# Patient Record
Sex: FEMALE | Race: WHITE | HISPANIC OR LATINO | Employment: OTHER | URBAN - METROPOLITAN AREA
[De-identification: names, ages, dates, MRNs, and addresses within clinical notes are randomized per-mention and may not be internally consistent; named-entity substitution may affect disease eponyms.]

---

## 2023-02-24 ENCOUNTER — OFFICE VISIT (OUTPATIENT)
Dept: PEDIATRICS | Facility: CLINIC | Age: 78
End: 2023-02-24

## 2023-02-24 VITALS
WEIGHT: 184.2 LBS | OXYGEN SATURATION: 97 % | TEMPERATURE: 98.9 F | SYSTOLIC BLOOD PRESSURE: 137 MMHG | HEART RATE: 71 BPM | DIASTOLIC BLOOD PRESSURE: 84 MMHG

## 2023-02-24 DIAGNOSIS — Z13.0 SCREENING, ANEMIA, DEFICIENCY, IRON: ICD-10-CM

## 2023-02-24 DIAGNOSIS — Z13.1 SCREENING FOR DIABETES MELLITUS: ICD-10-CM

## 2023-02-24 DIAGNOSIS — H57.9 EYE PROBLEM: Primary | ICD-10-CM

## 2023-02-24 DIAGNOSIS — Z13.220 SCREENING FOR HYPERLIPIDEMIA: ICD-10-CM

## 2023-02-24 DIAGNOSIS — Z11.59 NEED FOR HEPATITIS C SCREENING TEST: ICD-10-CM

## 2023-02-24 LAB
ANION GAP SERPL CALCULATED.3IONS-SCNC: 13 MMOL/L (ref 7–15)
BASOPHILS # BLD AUTO: 0 10E3/UL (ref 0–0.2)
BASOPHILS NFR BLD AUTO: 0 %
BUN SERPL-MCNC: 13.1 MG/DL (ref 8–23)
CALCIUM SERPL-MCNC: 9.2 MG/DL (ref 8.8–10.2)
CHLORIDE SERPL-SCNC: 106 MMOL/L (ref 98–107)
CHOLEST SERPL-MCNC: 235 MG/DL
CREAT SERPL-MCNC: 0.73 MG/DL (ref 0.51–0.95)
DEPRECATED HCO3 PLAS-SCNC: 23 MMOL/L (ref 22–29)
EOSINOPHIL # BLD AUTO: 0.2 10E3/UL (ref 0–0.7)
EOSINOPHIL NFR BLD AUTO: 4 %
ERYTHROCYTE [DISTWIDTH] IN BLOOD BY AUTOMATED COUNT: 13.8 % (ref 10–15)
GFR SERPL CREATININE-BSD FRML MDRD: 84 ML/MIN/1.73M2
GLUCOSE SERPL-MCNC: 91 MG/DL (ref 70–99)
HCT VFR BLD AUTO: 41.1 % (ref 35–47)
HDLC SERPL-MCNC: 45 MG/DL
HGB BLD-MCNC: 13.6 G/DL (ref 11.7–15.7)
LDLC SERPL CALC-MCNC: 159 MG/DL
LYMPHOCYTES # BLD AUTO: 1.5 10E3/UL (ref 0.8–5.3)
LYMPHOCYTES NFR BLD AUTO: 27 %
MCH RBC QN AUTO: 29.3 PG (ref 26.5–33)
MCHC RBC AUTO-ENTMCNC: 33.1 G/DL (ref 31.5–36.5)
MCV RBC AUTO: 89 FL (ref 78–100)
MONOCYTES # BLD AUTO: 0.4 10E3/UL (ref 0–1.3)
MONOCYTES NFR BLD AUTO: 7 %
NEUTROPHILS # BLD AUTO: 3.7 10E3/UL (ref 1.6–8.3)
NEUTROPHILS NFR BLD AUTO: 63 %
NONHDLC SERPL-MCNC: 190 MG/DL
PLATELET # BLD AUTO: 294 10E3/UL (ref 150–450)
POTASSIUM SERPL-SCNC: 4 MMOL/L (ref 3.4–5.3)
RBC # BLD AUTO: 4.64 10E6/UL (ref 3.8–5.2)
SODIUM SERPL-SCNC: 142 MMOL/L (ref 136–145)
TRIGL SERPL-MCNC: 155 MG/DL
WBC # BLD AUTO: 5.8 10E3/UL (ref 4–11)

## 2023-02-24 PROCEDURE — 99203 OFFICE O/P NEW LOW 30 MIN: CPT | Performed by: PHYSICIAN ASSISTANT

## 2023-02-24 PROCEDURE — 80061 LIPID PANEL: CPT | Performed by: PHYSICIAN ASSISTANT

## 2023-02-24 PROCEDURE — 80048 BASIC METABOLIC PNL TOTAL CA: CPT | Performed by: PHYSICIAN ASSISTANT

## 2023-02-24 PROCEDURE — 85025 COMPLETE CBC W/AUTO DIFF WBC: CPT | Performed by: PHYSICIAN ASSISTANT

## 2023-02-24 PROCEDURE — 86803 HEPATITIS C AB TEST: CPT | Performed by: PHYSICIAN ASSISTANT

## 2023-02-24 PROCEDURE — 36415 COLL VENOUS BLD VENIPUNCTURE: CPT | Performed by: PHYSICIAN ASSISTANT

## 2023-02-24 ASSESSMENT — ENCOUNTER SYMPTOMS: EYE PAIN: 1

## 2023-02-24 ASSESSMENT — PAIN SCALES - GENERAL: PAINLEVEL: NO PAIN (0)

## 2023-02-24 NOTE — PROGRESS NOTES
Assessment & Plan     Eye problem    - Adult Eye  Referral; Future    - Also, informed patient of the current referral placed by [facility].  They can call this number and setup appointment as well.      Need for hepatitis C screening test    - Hepatitis C Screen Reflex to HCV RNA Quant and Genotype; Future  - Hepatitis C Screen Reflex to HCV RNA Quant and Genotype    Screening for hyperlipidemia    - Lipid panel reflex to direct LDL Non-fasting; Future  - Lipid panel reflex to direct LDL Non-fasting    Screening for diabetes mellitus    - Basic metabolic panel  (Ca, Cl, CO2, Creat, Gluc, K, Na, BUN); Future  - Basic metabolic panel  (Ca, Cl, CO2, Creat, Gluc, K, Na, BUN)    Screening, anemia, deficiency, iron    - CBC with platelets and differential; Future  - CBC with platelets and differential         Appointment made with Dr. Cantor for followup, patient not able to establish with me, as I am a PCP partner.  This was explained to patient.  Patient will get physical and review labs with primary as scheduled.  Patient to followup with eye referral ASAP as well.      Return in about 1 week (around 3/3/2023), or Eye referral and then see primary care as scheduled.  Please be seen sooner if needed..    Patient plan:  Labs ordered today and will be drawn today as well.      You have a referral from the eye doctor for Vitreo Retinal Surgery.  You need to call them for an appointment and bring your referral.  Here is their number: [phone].     Please followup with a primary doctor as scheduled today to go over the lab work and complete a physical for you.  Donna will help you get this scheduled.      Jyoti Loera PA-C  [facility] CYNDI Lester is a 78 year old Tayler Friend, presenting for the following health issues:  Eye Problem (Bleeding in her back of the eye. Needs referral to specialist.)      Eye Problem          Concern - Need to establish care, get labs and  "referral for recent eye issue  Onset: New - Unsure when started  Description: She only sees \"dark\" out of half of her left eye.  Has been seen by eye doctor for this.    Intensity: moderate  Progression of Symptoms:  constant  Accompanying Signs & Symptoms: No other concerns by patient today  Previous history of similar problem: No      Was seen at [facility] and given referral for Vitreo Retinal Surgery.  Note from eye doctor reads: Macula Edema, Calero Spots, dots and blot, Microaneurysm of unknown etiology.   They were given a referral for surgery, but have not scheduled appointment yet.  Patient is brand new to [place] and does not have a primary doctor.  They were hoping to establish care today and get lab work done. Patient is otherwise healthy without other concerns today.  She does not have a history of chronic medical problems and denies any current medications or allergies.    Patient speaks Sinhala and is with a family member today who speaks some Cypriot and some English.        Review of Systems   Eyes: Positive for pain.      Constitutional, HEENT, cardiovascular, pulmonary, gi and gu systems are negative, except as otherwise noted.      Objective    /84 (BP Location: Right arm, Patient Position: Sitting, Cuff Size: Adult Large)   Pulse 71   Temp 98.9  F (37.2  C)   Wt 83.6 kg (184 lb 3.2 oz)   HC 16 cm (6.3\")   SpO2 97%   There is no height or weight on file to calculate BMI.  Physical Exam   GENERAL: healthy, alert and no distress  EYES: Eyes grossly normal to inspection, PERRL and conjunctivae and sclerae normal  RESP: lungs clear to auscultation - no rales, rhonchi or wheezes  CV: regular rate and rhythm, normal S1 S2, no S3 or S4, no murmur, click or rub, no peripheral edema and peripheral pulses strong  MS: no gross musculoskeletal defects noted, no edema  NEURO: Normal strength and tone, mentation intact and speech normal      Jyoti Loera PA-C            "

## 2023-02-24 NOTE — PATIENT INSTRUCTIONS
Labs ordered today and will be drawn today as well.      You have a referral from the eye doctor for Vitreo Retinal Surgery.  You need to call them for an appointment and bring your referral.  Here is their number: [phone].     Please followup with a primary doctor as scheduled today to go over the lab work and complete a physical for you.  Donna will help you get this scheduled.

## 2023-02-25 LAB — HCV AB SERPL QL IA: NONREACTIVE

## 2023-02-28 NOTE — RESULT ENCOUNTER NOTE
Note to staff: Please send a result letter    The results from your recent lab work are within normal limits.    -Normal red blood cell (hgb) levels, normal white blood cell count and normal platelet levels.  -LDL(bad) cholesterol level is elevated, HDL(good) cholesterol level is low and your triglycerides are elevated which can increase your heart disease risk.  A diet high in fat and simple carbohydrates, genetics and being overweight can contribute to this. ADVISE: exercising 150 minutes of aerobic exercise per week (30 minutes for 5 days per week or 50 minutes for 3 days per week are options), eating a low saturated fat/low carbohydrate diet, and omega-3 fatty acids (fish oil) 6095-8928 mg daily are helpful to improve this. In 3 months, you should recheck your fasting cholesterol panel by scheduling a lab-only appointment.  -Kidney function is normal (Cr, GFR), Sodium is normal, Potassium is normal, Calcium is normal, Glucose is normal.   -Hepatitis C antibody screen test shows no signs of a previous hepatitis C infection.    I still advised that you establish care with a primary doctor and they will recheck your cholesterol and even consider medication therapy for it as well.        Thank you for choosing [facility] for your health care needs,      Jyoti Loera PA-C

## 2023-06-28 ENCOUNTER — TRANSFERRED RECORDS (OUTPATIENT)
Dept: HEALTH INFORMATION MANAGEMENT | Facility: CLINIC | Age: 78
End: 2023-06-28
Payer: COMMERCIAL

## 2023-07-03 ENCOUNTER — TELEPHONE (OUTPATIENT)
Dept: URGENT CARE | Facility: URGENT CARE | Age: 78
End: 2023-07-03
Payer: COMMERCIAL

## 2023-07-03 ENCOUNTER — TELEPHONE (OUTPATIENT)
Dept: PEDIATRICS | Facility: CLINIC | Age: 78
End: 2023-07-03
Payer: COMMERCIAL

## 2023-07-03 DIAGNOSIS — H35.22: ICD-10-CM

## 2023-07-03 DIAGNOSIS — H34.232 BRANCH RETINAL ARTERY OCCLUSION OF LEFT EYE: Primary | ICD-10-CM

## 2023-07-03 NOTE — TELEPHONE ENCOUNTER
Naomi called on behalf of Tayler to interpret for her. Naomi inquires where she can get Tayler scheduled for imaging ordered by her specialist. Informed Naomi that no orders are in her chart, and she states that she has paper orders. Recommended Naomi and Tayler contact the [facility] at [phone] to get scheduled and to let them know they have outside orders.     Gonzalo Sigala RN on 7/3/2023 at 1:02 PM

## 2023-07-03 NOTE — TELEPHONE ENCOUNTER
Friend (Naomi) calling to interpret for patient. Pt on phone gave verbal consent.  Pt trying to schedule est care appt with a provider in [facility] and to get further imaging as advised by ophthemologist after seeing blood in part of left eye.    Patient has had blurry vision and seeing spots since February 2023  No active orders in chart review for imaging.  Pt was told she probably had a silent stroke at some point and possibly has small blood clots effecting vision.     Triaged symptoms; no new sx or stroke sx at this time. Routed to [place] [facility] to assist patient in scheduling appt. there.     Jimena FARMER RN  [facility]

## 2023-07-07 NOTE — TELEPHONE ENCOUNTER
The pt is aware and scheduled for her upcoming appointment.   Yulia Kenyon on 7/7/2023 at 7:42 AM

## 2023-07-09 ENCOUNTER — HOSPITAL ENCOUNTER (OUTPATIENT)
Dept: MRI IMAGING | Facility: CLINIC | Age: 78
Discharge: HOME | End: 2023-07-09
Attending: OPHTHALMOLOGY
Payer: COMMERCIAL

## 2023-07-09 DIAGNOSIS — H35.22: ICD-10-CM

## 2023-07-09 DIAGNOSIS — H34.232 BRANCH RETINAL ARTERY OCCLUSION OF LEFT EYE: ICD-10-CM

## 2023-07-09 PROCEDURE — 70544 MR ANGIOGRAPHY HEAD W/O DYE: CPT

## 2023-07-09 PROCEDURE — 70549 MR ANGIOGRAPH NECK W/O&W/DYE: CPT

## 2023-07-09 PROCEDURE — 70553 MRI BRAIN STEM W/O & W/DYE: CPT

## 2023-07-09 PROCEDURE — 255N000002 HC RX 255 OP 636: Performed by: OPHTHALMOLOGY

## 2023-07-09 PROCEDURE — A9585 GADOBUTROL INJECTION: HCPCS | Performed by: OPHTHALMOLOGY

## 2023-07-09 RX ORDER — GADOBUTROL 604.72 MG/ML
10 INJECTION INTRAVENOUS ONCE
Status: COMPLETED | OUTPATIENT
Start: 2023-07-09 | End: 2023-07-09

## 2023-07-09 RX ADMIN — GADOBUTROL 10 ML: 604.72 INJECTION INTRAVENOUS at 09:33

## 2023-08-09 DIAGNOSIS — H35.22: ICD-10-CM

## 2023-08-09 DIAGNOSIS — H34.232 BRANCH RETINAL ARTERY OCCLUSION OF LEFT EYE: Primary | ICD-10-CM

## 2023-10-10 ENCOUNTER — NURSE TRIAGE (OUTPATIENT)
Dept: PEDIATRICS | Facility: CLINIC | Age: 78
End: 2023-10-10
Payer: COMMERCIAL

## 2023-10-10 NOTE — TELEPHONE ENCOUNTER
S-(situation): Calling to get establish care appointment and care for acute bilateral leg swelling/redness/pain up to the knees.    B-(background): Bilateral leg swelling and redness started 1 month ago, pain started 2 weeks ago, pain is located posterior bilateral knees. Redness goes from toes to ankles and has started to spread up the legs. No streaking. Redness is not painful. No fever, no history of blood clots, cancer, heart failure, kidney disease or liver failure. Denies chest pain, difficulty breathing, no history of swelling in the past. Denies weeping, she is able to walk but after a while she complains of heel and anterior foot pain.       A-(assessment): Patient needs to be seen, not established with us.     R-(recommendations): Recommended urgent care for this concern and recommended she establish care with one of our providers going forward. Warm transferred call to  to schedule establish care appointment.     During call Patient's friend voiced frustration with our clinic stating that the patient was seen here twice and thought she was established here. Advised she was only seen here once according to her chart, and not sure how that appointment got scheduled as that provider is an extender only not a PCP option. (This was explained to patient according to the visit note.)    Recommended calling patient's insurance to get EOB for the second visit to verify where she was seen.     KIARA Melton on 10/10/2023 at 12:28 PM      Reason for Disposition   MODERATE swelling of both ankles (e.g., swelling extends up to the knees) AND new-onset or worsening    Additional Information   Negative: Swelling of face, arm or hands  (Exception: Slight puffiness of fingers during hot weather.)   Negative: Pregnant 20 or more weeks and sudden weight gain (i.e., > 2 lbs, 1 kg in one week)   Negative: Thigh or calf pain and only 1 side and present > 1 hour   Negative: Thigh, calf, or ankle swelling in only one leg    Negative: Thigh, calf, or ankle swelling in both legs, but one side is definitely more swollen (Exception: Longstanding difference between legs.)   Negative: Difficulty breathing at rest   Negative: Sounds like a life-threatening emergency to the triager   Negative: SEVERE swelling (e.g., swelling extends above knee, entire leg is swollen, weeping fluid)   Negative: Can't walk or can barely stand (new-onset)   Negative: Fever and red area (or area very tender to touch)    Protocols used: Leg Swelling and Edema-A-OH

## 2024-01-02 ENCOUNTER — OFFICE VISIT (OUTPATIENT)
Dept: PEDIATRICS | Facility: CLINIC | Age: 79
End: 2024-01-02
Payer: COMMERCIAL

## 2024-01-02 VITALS
BODY MASS INDEX: 34.83 KG/M2 | RESPIRATION RATE: 20 BRPM | HEIGHT: 60 IN | DIASTOLIC BLOOD PRESSURE: 82 MMHG | WEIGHT: 177.4 LBS | HEART RATE: 68 BPM | TEMPERATURE: 97.5 F | OXYGEN SATURATION: 98 % | SYSTOLIC BLOOD PRESSURE: 157 MMHG

## 2024-01-02 DIAGNOSIS — Z00.00 ROUTINE GENERAL MEDICAL EXAMINATION AT A HEALTH CARE FACILITY: Primary | ICD-10-CM

## 2024-01-02 DIAGNOSIS — I10 HYPERTENSION, UNSPECIFIED TYPE: ICD-10-CM

## 2024-01-02 DIAGNOSIS — R41.89 COGNITIVE IMPAIRMENT: ICD-10-CM

## 2024-01-02 DIAGNOSIS — H34.232 RETINAL ARTERY BRANCH OCCLUSION OF LEFT EYE: ICD-10-CM

## 2024-01-02 LAB
ALBUMIN SERPL BCG-MCNC: 4.1 G/DL (ref 3.5–5.2)
ALP SERPL-CCNC: 119 U/L (ref 40–150)
ALT SERPL W P-5'-P-CCNC: 11 U/L (ref 0–50)
ANION GAP SERPL CALCULATED.3IONS-SCNC: 10 MMOL/L (ref 7–15)
AST SERPL W P-5'-P-CCNC: 12 U/L (ref 0–45)
BILIRUB SERPL-MCNC: 0.3 MG/DL
BUN SERPL-MCNC: 16.8 MG/DL (ref 8–23)
CALCIUM SERPL-MCNC: 9.4 MG/DL (ref 8.8–10.2)
CHLORIDE SERPL-SCNC: 109 MMOL/L (ref 98–107)
CHOLEST SERPL-MCNC: 244 MG/DL
CREAT SERPL-MCNC: 0.72 MG/DL (ref 0.51–0.95)
DEPRECATED HCO3 PLAS-SCNC: 26 MMOL/L (ref 22–29)
EGFRCR SERPLBLD CKD-EPI 2021: 85 ML/MIN/1.73M2
FASTING STATUS PATIENT QL REPORTED: YES
GLUCOSE SERPL-MCNC: 107 MG/DL (ref 70–99)
HDLC SERPL-MCNC: 50 MG/DL
LDLC SERPL CALC-MCNC: 164 MG/DL
NONHDLC SERPL-MCNC: 194 MG/DL
POTASSIUM SERPL-SCNC: 3.8 MMOL/L (ref 3.4–5.3)
PROT SERPL-MCNC: 7.5 G/DL (ref 6.4–8.3)
SODIUM SERPL-SCNC: 145 MMOL/L (ref 135–145)
TRIGL SERPL-MCNC: 151 MG/DL
TSH SERPL DL<=0.005 MIU/L-ACNC: 1.99 UIU/ML (ref 0.3–4.2)

## 2024-01-02 PROCEDURE — 80061 LIPID PANEL: CPT | Performed by: INTERNAL MEDICINE

## 2024-01-02 PROCEDURE — 84443 ASSAY THYROID STIM HORMONE: CPT | Performed by: INTERNAL MEDICINE

## 2024-01-02 PROCEDURE — 36415 COLL VENOUS BLD VENIPUNCTURE: CPT | Performed by: INTERNAL MEDICINE

## 2024-01-02 PROCEDURE — 80053 COMPREHEN METABOLIC PANEL: CPT | Performed by: INTERNAL MEDICINE

## 2024-01-02 PROCEDURE — 99214 OFFICE O/P EST MOD 30 MIN: CPT | Mod: 25 | Performed by: INTERNAL MEDICINE

## 2024-01-02 PROCEDURE — 99397 PER PM REEVAL EST PAT 65+ YR: CPT | Performed by: INTERNAL MEDICINE

## 2024-01-02 ASSESSMENT — ENCOUNTER SYMPTOMS: BREAST MASS: 0

## 2024-01-02 ASSESSMENT — ACTIVITIES OF DAILY LIVING (ADL)
CURRENT_FUNCTION: LAUNDRY REQUIRES ASSISTANCE
CURRENT_FUNCTION: BATHING REQUIRES ASSISTANCE
CURRENT_FUNCTION: TRANSPORTATION REQUIRES ASSISTANCE

## 2024-01-02 ASSESSMENT — PAIN SCALES - GENERAL: PAINLEVEL: NO PAIN (0)

## 2024-01-02 NOTE — LETTER
January 3, 2024      Tayler Corcoran  [address] [place] [address]        Dear Tayler,     Here are your lab results.     1. Your cholesterol is elevated. Taken together with other risk factors, your estimated risk for cardiovascular events (heart attack or stroke) in the next 10 years is about 30%.  It is recommended to start cholesterol medication (statin) when the risk is greater than 7.5%.       I recommend you start a medication called atorvastatin. Can you please call clinic at [phone] to let us know what pharmacy you would like to use?       The 10-year ASCVD risk score (Manuel WALKER, et al., 2019) is: 30.2%    Values used to calculate the score:      Age: 78 years      Sex: Female      Is Non- : No      Diabetic: No      Tobacco smoker: No      Systolic Blood Pressure: 157 mmHg      Is BP treated: No      HDL Cholesterol: 50 mg/dL      Total Cholesterol: 244 mg/dL     2. Your fasting blood sugar and kidney, electrolyte, and liver tests were normal.     3. Your thyroid test was normal.     Please feel free to contact us with any questions or concerns!  Sincerely,  Hannah Yip MD  Internal Medicine - Pediatrics    Resulted Orders   Lipid panel reflex to direct LDL Fasting   Result Value Ref Range    Cholesterol 244 (H) <200 mg/dL    Triglycerides 151 (H) <150 mg/dL    Direct Measure HDL 50 >=50 mg/dL    LDL Cholesterol Calculated 164 (H) <=100 mg/dL    Non HDL Cholesterol 194 (H) <130 mg/dL    Patient Fasting > 8hrs? Yes     Narrative    Cholesterol  Desirable:  <200 mg/dL    Triglycerides  Normal:  Less than 150 mg/dL  Borderline High:  150-199 mg/dL  High:  200-499 mg/dL  Very High:  Greater than or equal to 500 mg/dL    Direct Measure HDL  Female:  Greater than or equal to 50 mg/dL   Male:  Greater than or equal to 40 mg/dL    LDL Cholesterol  Desirable:  <100mg/dL  Above Desirable:  100-129 mg/dL   Borderline High:  130-159 mg/dL   High:  160-189 mg/dL   Very High:  >=  190 mg/dL    Non HDL Cholesterol  Desirable:  130 mg/dL  Above Desirable:  130-159 mg/dL  Borderline High:  160-189 mg/dL  High:  190-219 mg/dL  Very High:  Greater than or equal to 220 mg/dL   Comprehensive metabolic panel (BMP + Alb, Alk Phos, ALT, AST, Total. Bili, TP)   Result Value Ref Range    Sodium 145 135 - 145 mmol/L      Comment:      Reference intervals for this test were updated on 09/26/2023 to more accurately reflect our healthy population. There may be differences in the flagging of prior results with similar values performed with this method. Interpretation of those prior results can be made in the context of the updated reference intervals.     Potassium 3.8 3.4 - 5.3 mmol/L    Carbon Dioxide (CO2) 26 22 - 29 mmol/L    Anion Gap 10 7 - 15 mmol/L    Urea Nitrogen 16.8 8.0 - 23.0 mg/dL    Creatinine 0.72 0.51 - 0.95 mg/dL    GFR Estimate 85 >60 mL/min/1.73m2    Calcium 9.4 8.8 - 10.2 mg/dL    Chloride 109 (H) 98 - 107 mmol/L    Glucose 107 (H) 70 - 99 mg/dL    Alkaline Phosphatase 119 40 - 150 U/L      Comment:      Reference intervals for this test were updated on 11/14/2023 to more accurately reflect our healthy population. There may be differences in the flagging of prior results with similar values performed with this method. Interpretation of those prior results can be made in the context of the updated reference intervals.    AST 12 0 - 45 U/L      Comment:      Reference intervals for this test were updated on 6/12/2023 to more accurately reflect our healthy population. There may be differences in the flagging of prior results with similar values performed with this method. Interpretation of those prior results can be made in the context of the updated reference intervals.    ALT 11 0 - 50 U/L      Comment:      Reference intervals for this test were updated on 6/12/2023 to more accurately reflect our healthy population. There may be differences in the flagging of prior results with similar  values performed with this method. Interpretation of those prior results can be made in the context of the updated reference intervals.      Protein Total 7.5 6.4 - 8.3 g/dL    Albumin 4.1 3.5 - 5.2 g/dL    Bilirubin Total 0.3 <=1.2 mg/dL   TSH with free T4 reflex   Result Value Ref Range    TSH 1.99 0.30 - 4.20 uIU/mL

## 2024-01-02 NOTE — PROGRESS NOTES
"SUBJECTIVE:   Tayler is a 78 year old, presenting for the following:  Wellness Visit and Establish Care        1/2/2024    10:32 AM   Additional Questions   Roomed by S   Accompanied by Barbara (friend)         1/2/2024    10:32 AM   Patient Reported Additional Medications   Patient reports taking the following new medications none       Are you in the first 12 months of your Medicare coverage?  No, not on Medicare, is currently on state insurance.     Healthy Habits:     In general, how would you rate your overall health?  Excellent    Frequency of exercise:  2-3 days/week    Duration of exercise:  Less than 15 minutes    Do you usually eat at least 4 servings of fruit and vegetables a day, include whole grains    & fiber and avoid regularly eating high fat or \"junk\" foods?  Yes    Taking medications regularly:  Not Applicable    Medication side effects:  Not applicable    Ability to successfully perform activities of daily living:  Transportation requires assistance, bathing requires assistance and laundry requires assistance    Home Safety:  No safety concerns identified    Hearing Impairment:  Feel that people are mumbling or not speaking clearly and need to ask people to speak up or repeat themselves    In the past 6 months, have you been bothered by leaking of urine?  No    In general, how would you rate your overall mental or emotional health?  Good    Additional concerns today:  Yes    Tayler is here for a preventive health visit.  Overall, she is doing well with the following concerns:      Here today with friend Tayler.   She has [facility] from [facility].   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 Sees only the top half of the visual field.   She is from [place], moved to [place] a couple of years ago and lives with some friends. They are looking to help her to get her citizenship (lives with friends) - she is a resident. They are asking for a letter from me for " immigration certifying that she his disability so that she does not have to take the test. Once she has citizenship, she could get medicare benefits and benefits.   She speaks very little.  Her friend talks for her during our visit, and it is apparent she understands the conversation. According to her friend, she had very little schooling, so she does not read or write much. She can read simple words in Burmese.  Her friend states that she can bathe independently,  but they always have someone there to help make sure she does not fall. She states that she can't be left alone. Neither the patient or her friend are aware of any trauma, accident, illness, or injury that has been associated with cognitive decline. She had a brain MRI/MRA this summer to assess her vision loss and she has mild diffuse atrophy likely due to small vessel disease and no evidence of large stroke.   Hypertension - hypertensive in clinic today. Her friend has taken her blood pressure occasionally at home and it has been good, somewhere near 120s/80s.   Lives in [place] for 2 years, before that about 5 years in [place].   Has 5 children.         Today's PHQ-2 Score:       1/2/2024    10:44 AM   PHQ-2 ( 1999 [employer])   Q1: Little interest or pleasure in doing things 0   Q2: Feeling down, depressed or hopeless 0   PHQ-2 Score 0   Q1: Little interest or pleasure in doing things Not at all   Q2: Feeling down, depressed or hopeless Not at all   PHQ-2 Score 0       Have you ever done Advance Care Planning? (For example, a Health Directive, POLST, or a discussion with a medical provider or your loved ones about your wishes): No, advance care planning information given to patient to review.  Advanced care planning was discussed at today's visit.       Fall risk  Fallen 2 or more times in the past year?: No  Any fall with injury in the past year?: No    Cognitive Screening   1) Repeat 3 items (Leader, Season, Table)    2) Clock draw: ABNORMAL unable to list all  numbers inside the clock   3) 3 item recall: Recalls 1 object   Results: ABNORMAL clock, 1-2 items recalled: PROBABLE COGNITIVE IMPAIRMENT, **INFORM PROVIDER**      Mini-CogTM Copyright MILIND Dewitt. Licensed by the author for use in [facility]; reprinted with permission ([email or web address]). All rights reserved.      Do you have sleep apnea, excessive snoring or daytime drowsiness? : no    Reviewed and updated as needed this visit by clinical staff   Tobacco  Allergies  Meds  Problems  Med Hx  Surg Hx  Fam Hx          Reviewed and updated as needed this visit by Provider   Tobacco  Allergies  Meds  Problems  Med Hx  Surg Hx  Fam Hx         Social History     Tobacco Use    Smoking status: Never     Passive exposure: Never    Smokeless tobacco: Never   Substance Use Topics    Alcohol use: Not on file             1/2/2024    10:44 AM   Alcohol Use   Prescreen: >3 drinks/day or >7 drinks/week? No     Do you have a current opioid prescription? No  Do you use any other controlled substances or medications that are not prescribed by a provider? None              Current providers sharing in care for this patient include:   Patient Care Team:  Hannah Yip MD as PCP - General (Internal Medicine)  Henny Billings CHW as Community Health Worker (Primary Care - CC)    The following health maintenance items are reviewed in Epic and correct as of today:  Health Maintenance   Topic Date Due    DEXA  Never done    ANNUAL REVIEW OF HM ORDERS  Never done    COVID-19 Vaccine (1) Never done    DTAP/TDAP/TD IMMUNIZATION (1 - Tdap) Never done    ZOSTER IMMUNIZATION (1 of 2) Never done    RSV VACCINE (Pregnancy & 60+) (1 - 1-dose 60+ series) Never done    Pneumococcal Vaccine: 65+ Years (1 of 1 - PCV) Never done    INFLUENZA VACCINE (1) Never done    MEDICARE ANNUAL WELLNESS VISIT  01/02/2025    FALL RISK ASSESSMENT  01/02/2025    LIPID  02/24/2028    ADVANCE CARE PLANNING  01/02/2029    HEPATITIS C SCREENING   "Completed    PHQ-2 (once per calendar year)  Completed    IPV IMMUNIZATION  Aged Out    HPV IMMUNIZATION  Aged Out    MENINGITIS IMMUNIZATION  Aged Out    RSV MONOCLONAL ANTIBODY  Aged Out             Pertinent mammograms are reviewed under the imaging tab.    Review of Systems   Breasts:  Negative for tenderness, breast mass and discharge.   Genitourinary:  Negative for pelvic pain, vaginal bleeding and vaginal discharge.         OBJECTIVE:   BP (!) 157/82 (BP Location: Right arm, Patient Position: Sitting, Cuff Size: Adult Large)   Pulse 68   Temp 97.5  F (36.4  C) (Tympanic)   Resp 20   Ht 1.531 m (5' 0.28\")   Wt 80.5 kg (177 lb 6.4 oz)   SpO2 98%   BMI 34.33 kg/m   Estimated body mass index is 34.33 kg/m  as calculated from the following:    Height as of this encounter: 1.531 m (5' 0.28\").    Weight as of this encounter: 80.5 kg (177 lb 6.4 oz).  Physical Exam  GENERAL: healthy, alert and no distress  EYES: Eyes grossly normal to inspection, PERRL and conjunctivae and sclerae normal  HENT: ear canals and TM's normal, nose and mouth without ulcers or lesions  RESP: lungs clear to auscultation - no rales, rhonchi or wheezes  CV: regular rate and rhythm, normal S1 S2, no S3 or S4, no murmur, click or rub, no peripheral edema and peripheral pulses strong  NEURO: Normal strength and tone and sensory exam grossly normal    Diagnostic Test Results:  Labs reviewed in Epic    ASSESSMENT / PLAN:   (Z00.00) Routine general medical examination at a health care facility  (primary encounter diagnosis)  Comment: Initial visit.  Routine labs ordered.  She has no records or history of previous health care. At follow up visit, will address vaccines.   Plan: DEXA HIP/PELVIS/SPINE - Future, Occupational         Therapy Referral, Adult Mental Health         Referral, Lipid panel reflex to direct LDL         Fasting, Comprehensive metabolic panel (BMP +         Alb, Alk Phos, ALT, AST, Total. Bili, TP), TSH         " with free T4 reflex, Primary Care - Care         Coordination Referral            (I10) Hypertension, unspecified type  Comment: Elevated in clinic today, but per report has been normal at home. Request readings in the next week and she will call clinic with those numbers.       (R41.89) Cognitive impairment  Comment: Unclear to what degree, and whether there has been a decline. The patient appears to understand spoken language, and she manages her pocket money per report. The family of friends whom she lives with may try to designate one daughter as a medical power of  to help her out. I have sent a care coordination referral to help with this and with the medical paperwork that has been requested for her citizenship application.   Plan: For evaluation of her cognition, I have sent an OT cognitive assessment referral as well as requested neurocognitive testing.     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 She has never been exposed to tobacco smoke. She has never used smokeless tobacco.      Appropriate preventive services were discussed with this patient, including applicable screening as appropriate for fall prevention, nutrition, physical activity, Tobacco-use cessation, weight loss and cognition.  Checklist reviewing preventive services available has been given to the patient.    Reviewed patients plan of care and provided an AVS. The Basic Care Plan (routine screening as documented in Health Maintenance) for Tayler meets the Care Plan requirement. This Care Plan has been established and reviewed with the Patient and caregiver.          Hannah Yip MD  [facility]    Identified Health Risks:  I have reviewed Opioid Use Disorder and Substance Use Disorder risk factors and made any needed referrals. The  patient reports that she has difficulty with activities of daily living. I have asked that the patient make a follow up appointment in 4 weeks where this issue will be further evaluated and addressed.  The patient was provided with written information regarding signs of hearing loss.

## 2024-01-02 NOTE — PATIENT INSTRUCTIONS
Por favor, medir la presion de kim 3-5 veces en la proxima semana, llamar a la clinica para dejar los numeros.       Preventive Health Recommendations    See your health care provider every year to  Review health changes.   Discuss preventive care.    Review your medicines if your doctor has prescribed any.    You no longer need a yearly Pap test unless you've had an abnormal Pap test in the past 10 years. If you have vaginal symptoms, such as bleeding or discharge, be sure to talk with your provider about a Pap test.    Every 1 to 2 years, have a mammogram.  If you are over 69, talk with your health care provider about whether or not you want to continue having screening mammograms.    Every 10 years, have a colonoscopy. Or, have a yearly FIT test (stool test). These exams will check for colon cancer.     Have a cholesterol test every 5 years, or more often if your doctor advises it.     Have a diabetes test (fasting glucose) every three years. If you are at risk for diabetes, you should have this test more often.     At age 65, have a bone density scan (DEXA) to check for osteoporosis (brittle bone disease).    Shots:  Get a flu shot each year.  Get a tetanus shot every 10 years.  Talk to your doctor about your pneumonia vaccines. There are now two you should receive - Pneumovax (PPSV 23) and Prevnar (PCV 13).  Talk to your pharmacist about the shingles vaccine.  Talk to your doctor about the hepatitis B vaccine.    Nutrition:   Eat at least 5 servings of fruits and vegetables each day.    Eat whole-grain bread, whole-wheat pasta and brown rice instead of white grains and rice.    Get adequate about Calcium and Vitamin D.     Lifestyle  Exercise at least 150 minutes a week (30 minutes a day, 5 days a week). This will help you control your weight and prevent disease.    Limit alcohol to one drink per day.    No smoking.     Wear sunscreen to prevent skin cancer.     See your dentist twice a year for an exam and  cleaning.    See your eye doctor every 1 to 2 years to screen for conditions such as glaucoma, macular degeneration, cataracts, etc.    Personalized Prevention Plan  You are due for the preventive services outlined below.  Your care team is available to assist you in scheduling these services.  If you have already completed any of these items, please share that information with your care team to update in your medical record.    Health Maintenance Due   Topic Date Due     Osteoporosis Screening  Never done     ANNUAL REVIEW OF HM ORDERS  Never done     COVID-19 Vaccine (1) Never done     Diptheria Tetanus Pertussis (DTAP/TDAP/TD) Vaccine (1 - Tdap) Never done     Zoster (Shingles) Vaccine (1 of 2) Never done     RSV VACCINE (Pregnancy & 60+) (1 - 1-dose 60+ series) Never done     Pneumococcal Vaccine (1 of 1 - PCV) Never done     Flu Vaccine (1) Never done     Patient Education   Personalized Prevention Plan  You are due for the preventive services outlined below.  Your care team is available to assist you in scheduling these services.  If you have already completed any of these items, please share that information with your care team to update in your medical record.  Health Maintenance Due   Topic Date Due     Osteoporosis Screening  Never done     ANNUAL REVIEW OF HM ORDERS  Never done     COVID-19 Vaccine (1) Never done     Diptheria Tetanus Pertussis (DTAP/TDAP/TD) Vaccine (1 - Tdap) Never done     Zoster (Shingles) Vaccine (1 of 2) Never done     RSV VACCINE (Pregnancy & 60+) (1 - 1-dose 60+ series) Never done     Pneumococcal Vaccine (1 of 1 - PCV) Never done     Flu Vaccine (1) Never done     Activities of Daily Living    Your Health Risk Assessment indicates you have difficulties with activities of daily living such as housework, bathing, preparing meals, taking medication, etc. Please make a follow up appointment for us to address this issue in more detail.  Hearing Loss: Care Instructions  Overview      Hearing loss is a sudden or slow decrease in how well you hear. It can range from slight to profound. Permanent hearing loss can occur with aging. It also can happen when you are exposed long-term to loud noise. Examples include listening to loud music, riding motorcycles, or being around other loud machines.  Hearing loss can affect your work and home life. It can make you feel lonely or depressed. You may feel that you have lost your independence. But hearing aids and other devices can help you hear better and feel connected to others.  Follow-up care is a key part of your treatment and safety. Be sure to make and go to all appointments, and call your doctor if you are having problems. It's also a good idea to know your test results and keep a list of the medicines you take.  How can you care for yourself at home?  Avoid loud noises whenever possible. This helps keep your hearing from getting worse.  Always wear hearing protection around loud noises.  Wear a hearing aid as directed.  A professional can help you pick a hearing aid that will work best for you.  You can also get hearing aids over the counter for mild to moderate hearing loss.  Have hearing tests as your doctor suggests. They can show whether your hearing has changed. Your hearing aid may need to be adjusted.  Use other devices as needed. These may include:  Telephone amplifiers and hearing aids that can connect to a television, stereo, radio, or microphone.  Devices that use lights or vibrations. These alert you to the doorbell, a ringing telephone, or a baby monitor.  Television closed-captioning. This shows the words at the bottom of the screen. Most new TVs can do this.  TTY (text telephone). This lets you type messages back and forth on the telephone instead of talking or listening. These devices are also called TDD. When messages are typed on the keyboard, they are sent over the phone line to a receiving TTY. The message is shown on a  "monitor.  Use text messaging, social media, and email if it is hard for you to communicate by telephone.  Try to learn a listening technique called speechreading. It is not lipreading. You pay attention to people's gestures, expressions, posture, and tone of voice. These clues can help you understand what a person is saying. Face the person you are talking to, and have them face you. Make sure the lighting is good. You need to see the other person's face clearly.  Think about counseling if you need help to adjust to your hearing loss.  When should you call for help?  Watch closely for changes in your health, and be sure to contact your doctor if:    You think your hearing is getting worse.     You have new symptoms, such as dizziness or nausea.   Where can you learn more?  Go to [email or web address]/[email or web address]  Enter R798 in the search box to learn more about \"Hearing Loss: Care Instructions.\"  Current as of: February 28, 2023               Content Version: 13.8    7370-2212 [employer].   Care instructions adapted under license by your healthcare professional. If you have questions about a medical condition or this instruction, always ask your healthcare professional. Health[employer], [employer] disclaims any warranty or liability for your use of this information.         "

## 2024-01-03 ENCOUNTER — PATIENT OUTREACH (OUTPATIENT)
Dept: CARE COORDINATION | Facility: CLINIC | Age: 79
End: 2024-01-03
Payer: COMMERCIAL

## 2024-01-03 NOTE — PROGRESS NOTES
Clinic Care Coordination Contact  [facility]/Voicemail    Clinical Data: Care Coordinator Outreach    Outreach Documentation Number of Outreach Attempt   1/3/2024   8:57 AM 1       Left message on patient's voicemail with call back information and requested return call.    Plan: Care Coordinator will try to reach patient again in 1-2 business days.    ALDA Harry, B.S. [facility] Care Coordination  [facility]:  [place], [place] and [place]  [phone]  [email or web address]

## 2024-01-03 NOTE — LETTER
[facility] CARE [facility]ORD[facility]  [address]  [place] [address]    January 4, 2024    Tayler Corcoran  [address]  [place] [address]      Dear Tayler,    I am a clinic community health worker who works with Hannah Yip MD with the [facility]. I have been trying to reach you recently to introduce Clinic Care Coordination. Below is a description of clinic care coordination and how I can further assist you.       The clinic care coordination team is made up of a registered nurse, , financial resource worker and community health worker who understand the health care system. The goal of clinic care coordination is to help you manage your health and improve access to the health care system. Our team works alongside your provider to assist you in determining your health and social needs. We can help you obtain health care and community resources, providing you with necessary information and education. We can work with you through any barriers and develop a care plan that helps coordinate and strengthen the communication between you and your care team.  Our services are voluntary and are offered without charge to you personally.    Please feel free to contact me with any questions or concerns regarding care coordination and what we can offer.      We are focused on providing you with the highest-quality healthcare experience possible.    Sincerely,     Henny Billings, ALDA, B.S. [facility]  Clinic Care Coordination  [facility]:  [place]  [phone]  [email or web address]

## 2024-01-04 ENCOUNTER — APPOINTMENT (OUTPATIENT)
Dept: INTERPRETER SERVICES | Facility: CLINIC | Age: 79
End: 2024-01-04
Payer: COMMERCIAL

## 2024-01-04 NOTE — PROGRESS NOTES
Clinic Care Coordination Contact  [facility]/Voicemail    Clinical Data: Care Coordinator Outreach    Outreach Documentation Number of Outreach Attempt   1/3/2024   8:57 AM 1   1/4/2024   1:36 PM 2       Left message on patient's voicemail with call back information and requested return call.    Plan: Care Coordinator will send care coordination introduction letter with care coordinator contact information and explanation of care coordination services via mail. Care Coordinator will do no further outreaches at this time.    Henny Billings, ALDA, B.S. [facility] Care Coordination  [facility]:  [place], [place] and [place]  [phone]  [email or web address]

## 2024-01-08 ENCOUNTER — APPOINTMENT (OUTPATIENT)
Dept: INTERPRETER SERVICES | Facility: CLINIC | Age: 79
End: 2024-01-08
Payer: COMMERCIAL

## 2024-01-08 ENCOUNTER — PATIENT OUTREACH (OUTPATIENT)
Dept: CARE COORDINATION | Facility: CLINIC | Age: 79
End: 2024-01-08
Payer: COMMERCIAL

## 2024-01-08 NOTE — PROGRESS NOTES
Clinic Care Coordination Contact  Community Health Worker Initial Outreach    CHW Initial Information Gathering:  Referral Source: PCP  Preferred Hospital: [facility]  [phone]  Preferred Urgent Care: [facility] [place], [phone]  Current living arrangement:: Other (lives with a friend)  Type of residence:: Apartment  Informal Support system:: Friends  No PCP office visit in Past Year: No  Transportation means:: Friend  CHW Additional Questions  If ED/Hospital discharge, follow-up appointment scheduled as recommended?: N/A  Medication changes made following ED/Hospital discharge?: N/A    Barbara, patients friend call back. She was not at the office visit but was told a jist of what happened. Pt was given forms from [employer], assuming she was given a HCD, CHW explained the need for this form and that it is not a consent to communicate. Will email a CTC form to Barbara at [email or web address]    Patient has [place] [employer], Barbara states that she is only a friend and can't help financially, patient currently lives for free with another friend. She has to be a resident for Medicare. Pt has never worked here nor filed taxes, she does not have a bank account.     Mental Health referral- confusion on what it was for. PCP referred for neuropsych assessment but they are also looking for a provider to do a letter for citizenship test.   - [facility] for neuropsych evaluation [phone]  Clinic/ providers who do citizenship cognitive screening: I believe patients need to have a court hearing date or have already received the N-525 form to be completed.  - Dr. Amee Ruffin - [phone]  - Dr. Mira Azul - [phone]  - Dr. Moreno Sams - [phone]  DEXA scan #[phone]  Occupational Therapy #[phone]   Financial Assistance/ Vida Care application mailed as well. Patient should qualify, she has never worked, $0 income and no  bank account. This will need to be noted on the application.    Patient accepts CC: Yes. Patient scheduled for assessment with RNCC on 1/15/24 at 2:00pm. Patient noted desire to discuss coordinating care, ACP documents- HCD and CTC forms.     Henny Billings, ALDA, B.S. [facility] Care Coordination  [facility]:  [place]  [phone]  [email or web address]

## 2024-01-15 ENCOUNTER — PATIENT OUTREACH (OUTPATIENT)
Dept: NURSING | Facility: CLINIC | Age: 79
End: 2024-01-15
Payer: COMMERCIAL

## 2024-01-15 NOTE — PROGRESS NOTES
Clinic Care Coordination Contact  OUTREACH    Referral Information:  Referral Source: PCP  Primary Diagnosis: Psychosocial    Chief Complaint   Patient presents with    Clinic Care Coordination - Initial     Scheduled RNCC assessment       Universal Utilization: 23% Risk of Admission or ED Visit   Clinic Utilization  Difficulty keeping appointments:: No  Compliance Concerns: No  No-Show Concerns: No  No PCP office visit in Past Year: No  Utilization      No Show Count (past year)  5             ED Visits  0             Hospital Admissions  0                    Current as of: 1/17/2024  8:49 AM              Clinical Concerns:  Current Medical Concerns:    Patient Active Problem List   Diagnosis    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 Barbara requests call back at 11am tomorrow as was at dental appointment with daughter that became available.     RNCC called the following day at 11 am and left voicemail message with call back information and requested a return call back.   RNCC attempted outreach again in the afternoon and spoke with patient who provided verbal consent to communicate with friend Barbara and other friend/roommate Reba (speaks Korean).      Needs shower chair.  Not eligible for MA because she needs natraulized and proof of residence. Has memory impairment. [place] [facility]. Barbara is working on getting her citizenship and food stamps.   Feet up to ankles red for the last 2-3 months.     [email or web address]     Healthcare Directive   Vida Care   CTC      Current Behavioral Concerns: none noted     Education Provided to patient: Care Coordination role, clinic after hours, appointments, resources as noted.    Pain  Pain (GOAL):: No  Health Maintenance Reviewed: Due/Overdue   Health Maintenance Due   Topic Date Due    DEXA  Never done    COVID-19 Vaccine (1) Never done    DTAP/TDAP/TD IMMUNIZATION (1 - Tdap)  Never done    ZOSTER IMMUNIZATION (1 of 2) Never done    RSV VACCINE (Pregnancy & 60+) (1 - 1-dose 60+ series) Never done    Pneumococcal Vaccine: 65+ Years (1 of 1 - PCV) Never done    INFLUENZA VACCINE (1) Never done      Clinical Pathway: None    Medication Management:  Medication review status: Medications reviewed and no changes reported per patient.        Patient is not currently taking any medications.      Functional Status:  Dependent ADLs:: Bathing  Dependent IADLs:: Transportation, Money Management, Shopping, Cleaning, Cooking, Laundry, Meal Preparation  Bed or wheelchair confined:: No  Mobility Status: Independent  Fallen 2 or more times in the past year?: No  Any fall with injury in the past year?: No    Living Situation:  Current living arrangement:: Other (lives with a friend)  Type of residence:: Apartment    Lifestyle & Psychosocial Needs:    Social Determinants of Health     Food Insecurity: Not on file   Depression: Not at risk (1/2/2024)    PHQ-2     PHQ-2 Score: 0   Housing Stability: Low Risk  (1/16/2024)    Housing Stability     Do you have housing? : Yes     Are you worried about losing your housing?: No   Tobacco Use: Low Risk  (1/2/2024)    Patient History     Smoking Tobacco Use: Never     Smokeless Tobacco Use: Never     Passive Exposure: Never   Financial Resource Strain: Low Risk  (1/16/2024)    Financial Resource Strain     Within the past 12 months, have you or your family members you live with been unable to get utilities (heat, electricity) when it was really needed?: No   Alcohol Use: Not on file   Transportation Needs: Low Risk  (1/16/2024)    Transportation Needs     Within the past 12 months, has lack of transportation kept you from medical appointments, getting your medicines, non-medical meetings or appointments, work, or from getting things that you need?: No   Physical Activity: Not on file   Interpersonal Safety: Not on file   Stress: Not on file   Social Connections: Not  on file     Diet:: Regular  Inadequate nutrition (GOAL):: No  Tube Feeding: No  Inadequate activity/exercise (GOAL):: No  Significant changes in sleep pattern (GOAL): No  Transportation means:: Friend  Restorationist or spiritual beliefs that impact treatment:: No  Mental health DX:: No  Mental health management concern (GOAL):: No  Chemical Dependency Status: No Current Concerns  Chemical Dependency Management:  (NA)  Informal Support system:: Friends      Resources and Interventions:  Current Resources:   Community Resources: DME  Supplies Currently Used at Home: Incontinence Supplies  Equipment Currently Used at Home: wheelchair, manual (will rent public wheelchair when out)  Employment Status: unemployed  Advance Care Plan/Directive  Advanced Care Plans/Directives on file:: No  Discussed with patient/caregiver:: Other (Comment) (Considering options)    Referrals Placed: Financial Services    The patient consented via Verbal consent to have contact information and resources sent via email in an unencrypted manner.     Care Plan:  Care Plan: Financial Wellbeing       Problem: Patient expresses financial resource strain       Long-Range Goal: Create an action plan to increase financial stability       Start Date: 1/16/2024 Expected End Date: 9/27/2024    Note:     Barriers: language barrier, citizenship status  Strengths: motivated, engaged in care coordination, friends supportive   Patient expressed understanding of goal: Yes   Action steps to achieve this goal:  1. I will complete and return thu care application   2. I will review additional resources if/as needed. (Clues, [employer], etc.)  3. I will complete and return consent to communicate form                             Care Plan: Advance Care Plan       Problem: Patient does not have a valid Health Care Directive       Long-Range Goal: Complete Health Care Directive       Start Date: 1/16/2024 Expected End Date: 9/27/2024    Note:     Barriers: language    Strengths: motivated, engaged in care coordination.   Patient expressed understanding of goal: yes  Action steps to achieve this goal:  1. RNCC will mail me a health care directive.   2. I will complete the health care directive. I can check with my bank about notary services, locate a nearby notary [email or web address]. I understand to make this document legal I will need to sign this in the presence of two witnesses who are not my listed health care agents, or having this notarized. (Dates must match)    3. I will provide a copy of my health care directive to my care team when completed. I can email a copy to [email or web address].   4. I can visit [email or web address] website or call [employer] at #[phone] for additional information or questions.   5. I will contact my care team with any barriers, questions or assistance needed with this goal. Care coordinator will remain available as needed.                              Care Plan: Specialty Referral       Problem: Patient is in need of specialty care       Long-Range Goal: Establish consistent relationship with specialist(s) as needed       Start Date: 1/16/2024 Expected End Date: 9/27/2024    Note:     Barriers: language barrier, provider availability - wait time to complete appointments, etc.   Strengths: motivated, engaged in care coordination, friends supportive  Patient expressed understanding of goal: Yes   Action steps to achieve this goal:  1. I will schedule and complete recommended follow ups:    [facility] for neuropsych evaluation [phone]  Clinic/ providers who do citizenship cognitive screening: I believe patients need to have a court hearing date or have already received the N-648 form to be completed.  - Dr. Amee Ruffin - [phone]  - Dr. Mira Azul - [phone]  - Dr. Moreno Sams - [phone]  DEXA scan #[phone]  Occupational Therapy #[phone]    2. I will discuss, review, schedule and complete recommended overdue health maintenance with my Primary Care Provider.   3. I will contact my care team with questions, concerns, support needs. I will use the clinic as a resource and I understand I can contact my clinic with 24/7 after hours services available. Care Coordinator will remain available as needed.                              Patient/Caregiver understanding: Patient/caregiver verbalized understanding and denies any additional questions or concerns at this time. RNCC engaged in [employer] communications during encounter.      Outreach Frequency: monthly, more frequently as needed  Future Appointments                In 2 weeks Hannah Yip MD [facility]          Plan: RNCC will send clinic care coordination introduction letter, patient centered plan of care, and medication list to patient via mail. Patient/caregiver was provided with writers contact information and encouraged to call with questions, concerns, support needs. RNCC will remain available as needed. CHWCC will follow up with patient/caregiver again in 1 month. RNCC will review chart in 6 weeks.      Viridiana Washington RN Care Coordinator  [facility] [place]  Email: [email or web address]  Phone: [phone]

## 2024-01-15 NOTE — LETTER
[facility] CARE COORDINATION  [address]  [place] [address]     January 17, 2024    Tayler Corcoran  [address] [place]  [place] [address]      Dear Tayler,    I am a clinic care coordinator who works with Hannah Yip MD with the [facility]. I wanted to thank you for spending the time to talk with me.  Below is a description of clinic care coordination and how I can further assist you.       The clinic care coordination team is made up of a registered nurse, , financial resource worker and community health worker who understand the health care system. The goal of clinic care coordination is to help you manage your health and improve access to the health care system. Our team works alongside your provider to assist you in determining your health and social needs. We can help you obtain health care and community resources, providing you with necessary information and education. We can work with you through any barriers and develop a care plan that helps coordinate and strengthen the communication between you and your care team.  Our services are voluntary and are offered without charge to you personally.    Please feel free to contact me with any questions or concerns regarding care coordination and what we can offer.      We are focused on providing you with the highest-quality healthcare experience possible.    Sincerely,     Viridiana Washington RN Care Coordinator  [facility] [place]  Email: [email or web address]  Phone: [phone]     Enclosed: I have enclosed a copy of the Patient Centered Plan of Care. This has helpful information and goals that we have talked about. Please keep this in an easy to access place to use as needed., I have enclosed an Authorization to Discuss Protected Health Information form. Please review, fill out, sign and send back to me so I can make sure we have this on file to be able to talk to  family/friends if you would like us to be able to. , Vida Care Application and Health Care Directive

## 2024-01-15 NOTE — LETTER
[facility]  Patient Centered Plan of Care  About Me:      Patient Name:  Tayler Corcoran    YOB: 1945  Age:         79 year old   [place] MRN:    [id number] Telephone Information:  Home Phone [phone]   Mobile [phone]       Address:  [address] [place] [address] Email address:  No e-mail address on record      Emergency Contact(s)    Name Relationship Lgl Grd Work Phone Home Phone Mobile Phone   1. CLARISSA BROWN* Friend    [phone]   2. LIBIACOLBY NATHAN Friend    [phone]           Primary language:  Persian     needed? Yes   [place] [facility]:  [phone] op. 1  Other communication barriers:Visual impairment; Glasses; [place] (Hard of hearing)    Preferred Method of Communication:     Current living arrangement: Other (lives with a friend)    Mobility Status/ Medical Equipment: Independent    Health Maintenance  Health Maintenance Reviewed: Due/Overdue   Health Maintenance Due   Topic Date Due    DEXA  Never done    COVID-19 Vaccine (1) Never done    DTAP/TDAP/TD IMMUNIZATION (1 - Tdap) Never done    ZOSTER IMMUNIZATION (1 of 2) Never done    RSV VACCINE (Pregnancy & 60+) (1 - 1-dose 60+ series) Never done    Pneumococcal Vaccine: 65+ Years (1 of 1 - PCV) Never done    INFLUENZA VACCINE (1) Never done      My Access Plan  Medical Emergency 911   Primary Clinic Line [facility] - [phone]   24 Hour Appointment Line [phone] or  [phone]) (toll-free)   24 Hour Nurse Line [phone] (toll-free)   Preferred Urgent Care [facility], [phone]     Preferred Hospital [facility]  [phone]     Preferred Pharmacy [id number] [employer] [facility] [place] - [address] [facility]     Behavioral Health Crisis Line The National Suicide Prevention Lifeline at 1-666.990.8018 or Text/Call 218     My Care Team Members  Patient Care Team         Relationship Specialty  Notifications Start End    Hannah Yip MD PCP - General Internal Medicine  1/2/24     Phone: [phone] Fax: [phone] [address] [facility] [address]    Kika Waters RN Personal Advocate & Liaison (PAL)  Admissions 1/3/24     [phone]    Viridiana Washington RN Lead Care Coordinator  Admissions 1/8/24     Phone: [phone]         Henny Billings, W Community Health Worker Primary Care -  Admissions 1/16/24     Phone: [phone]                     My Care Plans  Self Management and Treatment Plan    Care Plan  Care Plan: Financial Wellbeing       Problem: Patient expresses financial resource strain       Long-Range Goal: Create an action plan to increase financial stability       Start Date: 1/16/2024 Expected End Date: 9/27/2024    Note:     Barriers: language barrier, citizenship status  Strengths: motivated, engaged in care coordination, friends supportive   Patient expressed understanding of goal: Yes   Action steps to achieve this goal:  1. I will complete and return thu care application   2. I will review additional resources if/as needed. (Clues, [employer], etc.)  3. I will complete and return consent to communicate form                             Care Plan: Advance Care Plan       Problem: Patient does not have a valid Health Care Directive       Long-Range Goal: Complete Health Care Directive       Start Date: 1/16/2024 Expected End Date: 9/27/2024    Note:     Barriers: language   Strengths: motivated, engaged in care coordination.   Patient expressed understanding of goal: yes  Action steps to achieve this goal:  1. [facility] will mail me a health care directive.   2. I will complete the health care directive. I can check with my bank about [email or web address] services, locate a nearby notary [email or web address]. I understand to make this document legal I will need to sign this in the presence of two witnesses who are not my listed  health care agents, or having this notarized. (Dates must match)    3. I will provide a copy of my health care directive to my care team when completed. I can email a copy to [email or web address].   4. I can visit [email or web address] website or call gael marquez at #[phone] for additional information or questions.   5. I will contact my care team with any barriers, questions or assistance needed with this goal. Care coordinator will remain available as needed.                              Care Plan: Specialty Referral       Problem: Patient is in need of specialty care       Long-Range Goal: Establish consistent relationship with specialist(s) as needed       Start Date: 1/16/2024 Expected End Date: 9/27/2024    Note:     Barriers: language barrier, provider availability - wait time to complete appointments, etc.   Strengths: motivated, engaged in care coordination, friends supportive  Patient expressed understanding of goal: Yes   Action steps to achieve this goal:  1. I will schedule and complete recommended follow ups:    [facility] for neuropsych evaluation [phone]  Clinic/ providers who do citizenship cognitive screening: I believe patients need to have a court hearing date or have already received the N-648 form to be completed.  - Dr. Amee Ruffin - [phone]  - Dr. Mira Azul - [phone]  - Dr. Moreno Sams - [phone]  DEXA scan #[phone]  Occupational Therapy #[phone]   2. I will discuss, review, schedule and complete recommended overdue health maintenance with my Primary Care Provider.   3. I will contact my care team with questions, concerns, support needs. I will use the clinic as a resource and I understand I can contact my clinic with 24/7 after hours services available. Care Coordinator will remain available as needed.                                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     Care Coordination Start Date: 1/8/2024   Frequency of Care Coordination: monthly, more frequently as needed     Form Last Updated: 01/17/2024

## 2024-01-22 ENCOUNTER — PATIENT OUTREACH (OUTPATIENT)
Dept: PEDIATRICS | Facility: CLINIC | Age: 79
End: 2024-01-22
Payer: COMMERCIAL

## 2024-01-22 NOTE — TELEPHONE ENCOUNTER
Patient Quality Outreach Health Maintenance - PAL RN    Summary:    PAL RN contacted pt regarding overdue health maintenance    Patient is due/failing the following:       Topic Date Due    COVID-19 Vaccine (1) Never done    Diptheria Tetanus Pertussis (DTAP/TDAP/TD) Vaccine (1 - Tdap) Never done    Zoster (Shingles) Vaccine (1 of 2) Never done    Pneumococcal Vaccine (1 of 1 - PCV) Never done    Flu Vaccine (1) Never done       Health Maintenance Due   Topic Date Due    DEXA  Never done    ANNUAL REVIEW OF HM ORDERS  Never done    COVID-19 Vaccine (1) Never done    DTAP/TDAP/TD IMMUNIZATION (1 - Tdap) Never done    ZOSTER IMMUNIZATION (1 of 2) Never done    RSV VACCINE (Pregnancy & 60+) (1 - 1-dose 60+ series) Never done    Pneumococcal Vaccine: 65+ Years (1 of 1 - PCV) Never done    INFLUENZA VACCINE (1) Never done       Type of outreach:    Chart review performed, no outreach needed.    - Advised patient if any questions or concerns comes up to call the PAL RN.   - Patient given opportunity to ask questions and at this time there is nothing further needed.     Questions for provider review:    None                                                                                     Chart routed to none.    KIARA Anand  Patient Advocate Liason (PAL)  [facility]  Ph. [phone] / Fax. [phone]

## 2024-01-24 ENCOUNTER — THERAPY VISIT (OUTPATIENT)
Dept: OCCUPATIONAL THERAPY | Facility: CLINIC | Age: 79
End: 2024-01-24
Attending: INTERNAL MEDICINE
Payer: COMMERCIAL

## 2024-01-24 ENCOUNTER — TELEPHONE (OUTPATIENT)
Dept: OPHTHALMOLOGY | Facility: CLINIC | Age: 79
End: 2024-01-24

## 2024-01-24 DIAGNOSIS — Z00.00 ROUTINE GENERAL MEDICAL EXAMINATION AT A HEALTH CARE FACILITY: ICD-10-CM

## 2024-01-24 DIAGNOSIS — Z78.9 IMPAIRED INSTRUMENTAL ACTIVITIES OF DAILY LIVING (IADL): Primary | ICD-10-CM

## 2024-01-24 DIAGNOSIS — Z78.9 IMPAIRED MOBILITY AND ADLS: ICD-10-CM

## 2024-01-24 DIAGNOSIS — Z74.09 IMPAIRED MOBILITY AND ADLS: ICD-10-CM

## 2024-01-24 PROCEDURE — 96125 COGNITIVE TEST BY HC PRO: CPT | Mod: GO | Performed by: OCCUPATIONAL THERAPIST

## 2024-01-24 NOTE — TELEPHONE ENCOUNTER
Called and LVM with [place] interp     Tayler can make an appointment with Dr. Avery/ Dr. Mo/ Dr. Clay in a [place] [facility] spot     Alexandra Gregory Communication Facilitator on 1/24/2024 at 10:24 AM

## 2024-01-26 NOTE — PROGRESS NOTES
OCCUPATIONAL THERAPY EVALUATION  Type of Visit: Evaluation    See electronic medical record for Abuse and Falls Screening details.    Subjective      Presenting condition or subjective complaint:    Date of onset: 01/02/24 (orders date from Dr Hannah Yip)    Relevant medical history:   No past medical history on file.    Dates & types of surgery:  No past surgical history on file.      Prior diagnostic imaging/testing results:       Prior therapy history for the same diagnosis, illness or injury:        Prior Level of Function  Transfers: Independent  Ambulation: Independent  ADL: Assistive person  IADL:  Pt dependent for IADLs    Living Environment  Social support:   Lives with others who provide care for patieint  Type of home:     Stairs to enter the home:         Ramp:     Stairs inside the home:         Help at home:  Pt requires help for ADLs/IADLs  Equipment owned:       Employment:      Hobbies/Interests:      Patient goals for therapy:  Pt and caregivers seeking recommendations for maximizing patient safety    Pain assessment: Pain denied     Objective   \  Cognitive Status Examination  Orientation: Not oriented to place or time  Level of Consciousness: Flat/blunted affect  Follows Commands and Answers Questions: 50% of the time but only when prompted  Personal Safety and Judgement: Impaired  Memory: Impaired  Attention: Distractible during evaluation  Organization/Problem Solving: Problem solving impaired  Executive Function: Initiation impaired, Working memory impaired, decreased storage of information for performing tasks      BATHING: Mod Assist  Equipment:     UPPER BODY DRESSING: Min Assist  Equipment:     LOWER BODY DRESSING: Mod Assist  Equipment:     TOILETING: SBA  Equipment:     GROOMING: SBA  Equipment:     EATING/SELF FEEDING: SBA   Equipment:     ACTIVITY TOLERANCE: decreased    INSTRUMENTAL ACTIVITIES OF DAILY LIVING (IADL): Pt requires full assist with meal planning and prep, home and  financial management, communication and computer use, community mobility.      Assessment & Plan   CLINICAL IMPRESSIONS  Medical Diagnosis: Cognitive Decline    Treatment Diagnosis: impaired ADLs/IADLs    Impression/Assessment: Pt is a 79 year old female presenting to Occupational Therapy due to cognitive decline.  The following significant findings have been identified: Impaired activity tolerance, Impaired cognition, and Inability to direct their own care.  These identified deficits interfere with their ability to perform self care tasks, work tasks, household chores, driving , community mobility, medication management, financial management,  yard work, and meal planning and preparation as compared to previous level of function.     Clinical Decision Making (Complexity):  Assessment of Occupational Performance: 3-5 Performance Deficits  Occupational Performance Limitations: bathing/showering, dressing, hygiene and grooming, communication management, driving and community mobility, health management and maintenance, home establishment and management, meal preparation and cleanup, and shopping  Clinical Decision Making (Complexity): Moderate complexity    PLAN OF CARE  Treatment Interventions:      Long Term Goals          Frequency of Treatment: Eval Only  Duration of Treatment:       Recommended Referrals to Other Professionals:   Education Assessment: Learner/Method: Patient;Caregiver  Education Comments: Pt instructed in CPT safety recommendations including 24 hour supervision.  caregiver verbalized her understanding. Handouts were issued for level 3.5 CPT     Risks and benefits of evaluation/treatment have been explained.   Patient/Family/caregiver agrees with Plan of Care.     Evaluation Time:            Signing Clinician: Mildred Gaitan OT      [facility]                                                                                   OUTPATIENT OCCUPATIONAL  THERAPY

## 2024-01-26 NOTE — PROGRESS NOTES
Cognitive Performance Test    SUMMARY OF TEST:    The Cognitive Performance Test (CPT) is a standardized performance-based assessment to measure working memory/executive function processing capacities that underlie functional performance. Subtasks include common basic and instrumental activities of daily living (ADL/IADL) which are rated based on the manner in which patients respond to task demands of varying complexity. The total CPT score describes a level of functioning that indicates how information is processed, implications for functional activities, potential safety risks and a recommended level of supervision or assist based on cognitive function. The highest total score on this test is in the range of 5.6 to 5.8.    DATE OF TESTIN2024    RESULTS OF TESTING:                                                                                         CPT Subtest Results    MEDBOX:  SHOP/GLOVES: 3/6 PHONE: 3/6   WASH:   TRAVEL:  TOAST: 3.5/5   DRESS: 3.5/5   TOTAL CPT SCORE:  21/33     Average CPT Score  3.5/5.5    INTERPRETATION OF TEST RESULTS:    Based on the Cognitive Performance Test, this patient scored at CPT Level 3,5.  See CPT Levels reference below.    Summary of functional cognitive status:   Moderate Functional Decline:  Unable to complete complex daily tasks, significant difficulty with self-care tasks - shows significant decline with self cares and needs set-up, demonstration, and step by step assist.  Shows inability to complete complex tasks and needs prompting and cues to complete even simple tasks. Language may show decline, such as less spontaneous speech, or incomplete sentences. Attention span is limited. Routine and structure is important.  Cannot learn new information.  Not oriented to time or place.    Factors affecting performance:  Possible cultural/language barrier  Educational background    Recommendations:    Assist for ADL/IADL:  Meal preparation, Cleaning, Laundry,  Shopping, Finances, Driving, and Medication management  Supervision for ADL/IADL:  ADL  Supervision in living settin hour                                                       TIME ADMINISTERING TEST: 77 min    TIME FOR INTERPRETATION AND PREPARATION OF REPORT: 15 minutes    TOTAL TIME: 92 minutes      CPT Levels Reference:    Patient's Average CPT Score:  3.5                                                                                                                                                  Individual scores range along a continuum as outlined below.  In addition to cognitive status, other factors may affect safety in a home environment.  Please refer to specific recommendations for this patient.    ___5.6-5.8  Normal functioning (absence of cognitive-functional disability).  Independent in managing personal affairs, monitors and directs own behavior.  Uses complex information to carry out daily activities with safety and accuracy.    Proficient with instrumental activities of daily living (IADL) and learning new activity.  Problems are anticipated, errors are avoided, and consequences of actions are considered.      ___5.0   Mild cognitive-functional disability; deficits in working memory and executive thought processes. Difficulty using complex information. Problems may be observed with recent memory, judgment, reasoning and planning ahead. May be impulsive or have difficulty anticipating consequences.  Safety:  May require assistance to plan ahead; or to manage complex medication schedules, appointments or finances.  Hazardous activities may need to be monitored or limited.  ADL:  Mild functional decline.  Able to complete basic self-care and routine household tasks.  May have difficulty with complex daily tasks such as reading, writing, meal preparation, shopping or driving.   Learns through hands on teaching. Self-centered behavior or difficulty considering the needs of others may be seen  "related to trouble seeing the  whole picture\". Can appear disorganized or uninhibited.    ___4.5  Mild to moderate cognitive-functional disability. Significant deficits in working memory and executive thought processes. Judgment, reasoning and planning show obvious impairment.  Distractible with inability to shift attention/actions given competing stimuli.  Difficulty with problem solving and managing details. Complex daily tasks performed with inconsistency, difficulty, or error.     Safety:  Medications should be monitored, stove use may require supervision, and driving ability may be affected.  Impaired safety awareness with inability to anticipate potential problems.  May not recognize or respond to emergent situations. Requires frequent check-in support.   ADL:  Mild difficulty with simple everyday self-care tasks. Benefits from structured, routine activity.  Will likely need reminders to complete tasks outside of the routine. Requires assistance with planning and IADL tasks like shopping and finances. Learns concrete tasks through repetition, but performance may not generalize. Tends to be impulsive with poor insight. Self centered behavior or inability to consider the needs of others is common.    ___4.0  Moderate cognitive-functional disability; abstract to concrete thought processes. Working memory and executive function impairments are obvious. Difficulty with planning and problem solving.  Behavior is goal-directed, but unable to follow multi-step directions, is easily distracted, and may not recognize mistakes.  Inability to anticipate hazards or understand precautions.  Safety:  Recommend 24-hour supervision for safety. Supervision needed for medication management and for hazardous activities. May not be able to follow a restricted diet. Can get lost in unfamiliar surroundings. Generally, persons functioning at level 4 should not be driving.   ADL:  Some decline in quality or frequency of ADL.  " [place] enhanced by use of a routine, simple concrete directions, and caregiver set-up of needed items. Complex tasks such as money or home management typically requires assistance.  Relies heavily on vision to guide behavior; will ignore objects/hazards not in plain sight and can be distracted by irrelevant objects. Often has poor insight.  Able to carry out social conversation and may verbally  cover  for deficits leading caregivers to believe they are capable of functioning independently.       __x_3.5  Moderate cognitive-functional disability; increased cues needed for task completion. Aware of concrete task steps but needs prompting or cues to initiate and complete simple tasks. Attention span is limited, simple directions may need to be repeated, and re-focus to a topic or task may be required.  Safety:  24-hour supervision required for safety and for assistance with daily tasks. Assistance required with medications, and access to medication should be limited. Meals, nutrition and dietary restrictions need to be monitored.  All hazardous activities should be restricted or supervised. Should not drive. Prone to wandering and can become lost.  ADL:  Moderate functional decline. Familiar tasks usually requires set-up of supplies and directions to complete steps. May need objects handed to them for task initiation. Function best with a set schedule in familiar surroundings with familiar people. All complex tasks must be done by others. Vocabulary is diminished and speech often unfocused.

## 2024-01-29 ENCOUNTER — ANCILLARY PROCEDURE (OUTPATIENT)
Dept: BONE DENSITY | Facility: CLINIC | Age: 79
End: 2024-01-29
Attending: INTERNAL MEDICINE
Payer: COMMERCIAL

## 2024-01-29 DIAGNOSIS — Z00.00 ROUTINE GENERAL MEDICAL EXAMINATION AT A HEALTH CARE FACILITY: ICD-10-CM

## 2024-01-29 PROCEDURE — 77080 DXA BONE DENSITY AXIAL: CPT | Mod: TC | Performed by: PHYSICIAN ASSISTANT

## 2024-01-30 ENCOUNTER — APPOINTMENT (OUTPATIENT)
Dept: INTERPRETER SERVICES | Facility: CLINIC | Age: 79
End: 2024-01-30
Payer: COMMERCIAL

## 2024-02-02 ENCOUNTER — OFFICE VISIT (OUTPATIENT)
Dept: PEDIATRICS | Facility: CLINIC | Age: 79
End: 2024-02-02
Payer: COMMERCIAL

## 2024-02-02 VITALS
WEIGHT: 176 LBS | OXYGEN SATURATION: 98 % | HEART RATE: 65 BPM | HEIGHT: 60 IN | BODY MASS INDEX: 34.55 KG/M2 | SYSTOLIC BLOOD PRESSURE: 137 MMHG | DIASTOLIC BLOOD PRESSURE: 80 MMHG | TEMPERATURE: 97.7 F | RESPIRATION RATE: 20 BRPM

## 2024-02-02 DIAGNOSIS — M85.80 OSTEOPENIA, UNSPECIFIED LOCATION: ICD-10-CM

## 2024-02-02 DIAGNOSIS — R41.89 COGNITIVE DECLINE: Primary | ICD-10-CM

## 2024-02-02 DIAGNOSIS — E78.5 HYPERLIPIDEMIA LDL GOAL <100: ICD-10-CM

## 2024-02-02 PROCEDURE — 99214 OFFICE O/P EST MOD 30 MIN: CPT | Performed by: PHYSICIAN ASSISTANT

## 2024-02-02 RX ORDER — ATORVASTATIN CALCIUM 20 MG/1
20 TABLET, FILM COATED ORAL DAILY
Qty: 30 TABLET | Refills: 1 | Status: SHIPPED | OUTPATIENT
Start: 2024-02-02

## 2024-02-02 ASSESSMENT — PAIN SCALES - GENERAL: PAINLEVEL: NO PAIN (0)

## 2024-02-02 NOTE — PROGRESS NOTES
"  Assessment & Plan     Cognitive decline  Moderate.     Hyperlipidemia LDL goal <100  Begin lipitor 20 mg daily. Will repeat labs in 2 months.   - atorvastatin (LIPITOR) 20 MG tablet; Take 1 tablet (20 mg) by mouth daily    Osteopenia, unspecified location  Begin calcium and vitamin D as directed.     Lynda Lester is a 79 year old, presenting for the following health issues:  Patient here for follow up with her current caretaker/friend and her son.     No chief complaint on file.  Patient is here today to follow up on lab work from previous visit with primary. Also wants to review cognitive level functions form that patient brought in.   She needs a letter for Medicare to obtain benefits.     Patient had cognitive testing completed and reveals moderate function decline with recommendations for 24 hour supervision.   Osteopenia on DEXA   Lipid panel reveals elevated cholesterol with an ASCVD risk at 30%.    Review of Systems  Constitutional, HEENT, cardiovascular, pulmonary, gi and gu systems are negative, except as otherwise noted.      Objective    /80   Pulse 65   Temp 97.7  F (36.5  C) (Tympanic)   Resp 20   Ht 1.531 m (5' 0.28\")   Wt 79.8 kg (176 lb)   SpO2 98%   BMI 34.05 kg/m    Body mass index is 34.05 kg/m .  Physical Exam   GENERAL: alert and no distress  Patient does not make eye contact or speak.     Signed Electronically by: Ibis Mendez PA-C    "

## 2024-02-02 NOTE — COMMUNITY RESOURCES LIST (PATIENT PREFERRED LANGUAGE)
02/02/2024   [facility]  N/A  Si tiene preguntas sobre esta lista de recursos o necesidades de atención adicionales, comuníquese con lopez clínica de atención primaria o administrador de atención.  Phone: [phone]   Email: N/A   Address: [address] [place] [address]   Hours: N/A        Estabilidad financiera       Asistencia para el pago de servicios públicos  1  Comunidades [address] [place] [address] de recursos familiares [place] Distancia: 5.83 millas      En persona, Teléfono/Virtual   [address] [place] [address]  Idioma: Inglés  Horas: lunes 8:00 - 16:00 , iggy 8:00 - 19:00 , miércoles - jueves 8:00 - 16:00  Honorarios: Sophie   Phone: [phone] Email: info@PWC [employer] Water Corporation Website: https://Portola Pharmaceuticals/resources/resource-centers/     2  Comunidades [address] [place] [address] de recursos familiares de [place] Distancia: 6.81 millas      En persona, Teléfono/Virtual   [address] [place] [address]  Idioma: Angel Martinezas: lunes - jueves 9:00 - 16:00  Honorarios: Sophie   Phone: [phone] Email: info@PWC [employer] Water Corporation Website: https://"SpaceCraft, Inc."org/resources/resource-centers/          Comida y nutrición       Despensa de alimentos  3  La wade abierta Distancia: 4.74 millas      Joel Mckoy   [address] [place] [address]  Idioma: Angel  Horas: lunes - miércoles 10:00 - 15:00 , iggy 17:30 - 19:30 , jueves 17:30 - 19:30 , jueves - viernes 10:00 - 12:00  Honorarios: Sophie   Phone: [phone] Website: http://www.NextGreatPlace.org     4  [address]  Hernandez Distancia: 5.15 millas      En persona   [address] [place] [address]  Idioma: Angel Goode: iggy 10:00 - 16:00 , mark 10:00 - 16:00  Honorarios: Sophie   Phone: [phone] Email: communications@Oberon Media Website: http://www.sotEuroSite Power.org     Asistencia para la solicitud de SNAP  5  Comunidades 360 - Centro de recursos  familiares [place] Distancia: 2.37 millas      En persona   [address] [place] [address]  Idioma: Emilianalés  Horas: lunes 8:00 - 16:00 , iggy 8:00 - 19:00 , miércoles - jueves 8:00 - 16:00  Honorarios: Sanford   Phone: [phone] Email: info@DebtLESS Community Website: https://Soma Networks.org/resources/resource-centers/     6  Asociación de acción comunitaria (CAP) de los condados de Kyler, Dawson y [place] - [place] Distancia: 3.66 millas      En persona   [address] [place] [address]  Idioma: Español, Inglés  Horas: lunes - viernes 8:00 - 20:00  Honorarios: Sanford   Phone: [phone] Email: info@Learn It Systems Website: http://www.GI-View.org     Comedor social o comidas sophie  7  Thony junto a la Ld Luterana del [place] - panes y peces Distancia: 0.65 millas      Levantar   [address]  [place] [address]  Idioma: Angel Tafoyaas: lunes - jueves 17:30 - 18:30  Honorarios: Sophie   Phone: [phone] Email: prema@Yakaz Website: http://Modern Mast.Eutechnyx/wordpress/?page_id=5168     8  Ld Luter[address] Jefe Jose Rafael - panes y peces Distancia: 4.48 millas      Levantar   [address] [place] [address]  Idioma: Inglés  Horas: lunes - viernes 17:00 - 18:00  Honorarios: Sanford   Phone: [phone] Email: contactus@OnCore Biopharma.Eutechnyx Website: https://www.OnCore Biopharma.org/          Líneas directas y líneas de ayuda       Línea directa - Crisis de vivienda  9  Meaghan Rod (oficina principal) - Servicios de emergencia Distancia: 4.48 millas      Teléfono/Virtual   [address] [place] [address]  Idioma: Angel Goode: charlie gomez Abierto 24 horas   Phone: [phone] Email: info@[facility].Eutechnyx Website: http://Doist.Eutechnyx     10  La vivienda de nuestro Rupert Distancia: 4.68 millas      Teléfono/Virtual   [address] [place] [address]  Idioma: Angel Goode: charlie gomez Abierto 24 horas   Phone: [phone] Email:  communications@oscs-mn.org Website: https://oscs-mn.org/oursaviourshousing/          Alojamiento       Punto de acceso de entrada coordinado  11  Atención comunitaria [facility] - Clínica del centro Distancia: 11.88 millas      En persona, Teléfono/Virtual   [address] [place] [address]  Idioma: Inglés  Horas: lunes - viernes 8:30 - 16:30  Honorarios: Sophie   Phone: [phone] Email: info@[facility].org Website: https://www.[facility].org/locations/[facility]/     12  Servicio Social [facility]) - Servicios de vivienda Distancia: 11.92 millas      En persona   [address] [place] [address]  Idioma: Angel  Horas: lunes - viernes 9:00 - 17:00  Honorarios: Sophie   Phone: [phone] Email: housing@[facility] Website: http://www.[facility].org/[facility] de acogida o [place] diurno  13  [place] de la Escucha de [place] Distancia: 12.14 millas      En persona   [address] [place] [address]  Idioma: Inglés  Horas: lunes - viernes 9:00 - 16:00  Honorarios: Sophie   Phone: [phone] Email: manolo@listeningBloominous.org Website: http://listeningBloominous.org     14  Comunidad [place] Nikhil Distancia: 12.3 millas      En persona   [address] [place] [address]  Idioma: Angel  Horas: lunes - viernes 12:00 - 15:00  Honorarios: Sophie   Phone: [phone] Email: chuyitaSeeJay@Solar Site Design.ABC Live Website: http://Small Bone Innovations.org/     Asistencia para la búsqueda de vivienda  15  Autoridad de Vivienda y Reurbanización de [place] - Programa de viviendas de alquiler para futuros compradores de vivienda Distancia: 8.33 millas      Teléfono/Virtual   [address] [place] [address]  Idioma: Angel Goode: charlie yanez 8:00 - 16:30  Honorarios: Sophie   Phone: [phone] Email: vanessa@NeuroDiagnostic Institute.[facility] Website: https://www.[facility]/hra/[facility]-and-nqevygjjxzfyi-tzrqdhcpx-ego     16  [facility] - Servicios para  personas mayores y discapacitadas Distancia: 10.87 millas      En persona   [address] [place] [address]  Idioma: Inglés  Horas: charlie - renata 8:00 - 16:00  Honorarios: Rajesh Barrios Tarifa móvil   Phone: [phone] Email: safia@[facility]. Website: https://www.[facility]./HealthFamily/Disabilities     [place] para familias  17  [place] familiar [facility] Distancia: 2 millas      En persona   [address] [place] [address]  Idioma: Emilianalorenzo  Michelleas: charlie gomez Abierto 24 horas  Honorarios: Jae Barrios   Phone: [phone] ext.1 Email: info@eGenerations Website: http://www.eGenerations     [place] para particulares  18  Asociación de acción comunitaria (CAP) de los condados de Ronen Chávez y [place] - [place] Distancia: 7.36 millas      En persona   [address] [place] [address]  Idioma: Angel Tafoya  Horas: cahrlie - renata 8:00 - 16:30  Honorarios: Sophie   Phone: [phone] Email: info@Danlan Website: http://www.Danlan     19  Caridades Católicas de Mar[place] y [place] - [place] [facility] [place] - [place] [facility] [place] Distancia: 4.51 millas      En persona   [address] [place] [address]  Idioma: Angel Martinezas: charlie perkinso 17:00 - 10:00  Honorarios: Sophie Valladares   Phone: [phone] Email: info@Entrada Website: https://www.Clear Creek Networks.org/locations/[facility]-saint-paul/          Transporte       Transporte gratuito o de bajo costo.  20  Servicios GAPP[employer] Inc. Distancia: 3.02 millas      En persona   [address] [facility] [address] [place] [address]  Idioma: Angel Goode: charlie yanez 7:00 - 17:00  Honorarios: Sophie   Phone: [phone] Email: hfwtmzjitvmj37@HigherNext.Trac Emc & Safety Website: https://GPB Scientific/     21  DARDOS - The LOOP - Autobús circulador de [place] Distancia: 10.87 millas      En persona   [address] [place] [place]  [address]  Idioma: Angel  Horas: iggy 9:00 - 14:00  Honorarios: Auto pago   Phone: [phone] Email: info@Klik Technologies Website: http://www.Magnetecs.org/     Transporte a citas médicas.  22  Movilidad en ciudades gemelas Distancia: 5.15 millas      En persona   [address] [place] [address]  Idioma: Angel Johnston Oromo, somalí  Horas: charlie gramajo 5:00 - 21:00  Honorarios: Auto pago, Seguro   Phone: [phone] Email: info@Ultracell Website: http://www.Ultracell/     23  Transporte Asistido Distancia: 6.46 millas      En persona   [address] [facility] [place] [address]  Idioma: Angel Tafoya  Horas: charlie gomez Carolee Solamente  Honorarios: Auto pago   Phone: [phone] Email: christ@Giraffic Website: http://www.Giraffic/          Números y sitios web importantes       Servicios de emergencia   911  Servicios de la ciudad   311  Control de envenenamiento   [phone]  Línea vital de prevención del suicidio   [phone] (TALK)  Línea directa contra el abuso de niños   [phone] (4-A-Child)  Línea directa contra el abuso sexual   [phone] (HOPE)  Línea directa nacional de emergencia para fugitivos   [phone] (RUNAWAY)  Línea de ayuda para la joe embarazada   [phone]  Derivación para el tratamiento por abuso de sustancias   [phone] (HELP)

## 2024-02-02 NOTE — COMMUNITY RESOURCES LIST (ENGLISH)
02/02/2024   [facility]  N/A  For questions about this resource list or additional care needs, please contact your primary care clinic or care manager.  Phone: [phone]   Email: N/A   Address: [address] [place] [address]   Hours: N/A        Financial Stability       Utility payment assistance  1  [address] [place] [address] Distance: 4.48 miles      In-Person, Phone/Virtual   [address] [place] [address]  Language: English  Hours: Mon 8:00 AM - 4:00 PM , Tue 8:00 AM - 7:00 PM , Wed - Thu 8:00 AM - 4:00 PM  Fees: Free   Phone: [phone]) [phone] Email: info@"Intelligent Currency Validation Network[employer] Inc." Website: https://Brickfish/resources/resource-centers/     2  [address] [place] [address] Distance: 4.74 miles      In-Person, Phone/Virtual   [address] [address]  Language: English  Hours: Mon - Thu 9:00 AM - 4:00 PM  Fees: Free   Phone: (686) 673-5398 Email: info@Deskom.Vertical Nursing Partners Website: https://Brickfish/resources/resource-centers/          Food and Nutrition       Food pantry  3  The Open Door Distance: 2 miles      Delivery, Pickup   [address] [place] [address]  Language: English  Hours: Mon - Wed 10:00 AM - 3:00 PM , Tue 5:30 PM - 7:30 PM , Thu 5:30 PM - 7:30 PM , Thu - Fri 10:00 AM - 12:00 PM  Fees: Free   Phone: (898) 319-5241 Website: http://www.theStorybrickspantry.org     4  [place]  the [facility] Distance: 2.37 miles      In-Person   [address] [place] [address]  Language: English  Hours: Tue 10:00 AM - 4:00 PM , Thu 10:00 AM - 4:00 PM  Fees: Free   Phone: (334) 201-1675 Email: communications@ThousandEyes Website: http://www.sotv.org     SNAP application assistance  5  [address] [facility] Distance: 4.48 miles      In-Person   [address] [place] [address]  Language: English  Hours: Mon 8:00 AM - 4:00  PM , Tue 8:00 AM - 7:00 PM , Wed - Thu 8:00 AM - 4:00 PM  Fees: Free   Phone: (626) 167-7401 Email: info@Lezu365.Bandspeed Website: https://Collusion.org/resources/resource-centers/     6  Community Action Partnership (CAP) of Ronen Chávez & [facility] Distance: 5.15 miles      In-Person   [address] [place] [address]  Language: English, Bulgarian  Hours: Mon - Fri 8:00 AM - 8:00 PM  Fees: Free   Phone: (791) 345-7316 Email: info@capc6 Software Corporation.org Website: http://www.SabrTech.org     Soup kitchen or free meals  7  [place] by the [facility] - Loaves and Fishes Distance: 0.65 miles      Pickup   [address] [place] [address]  Language: English, Bulgarian  Hours: Mon - Thu 5:30 PM - 6:30 PM  Fees: Free   Phone: (322) [phone] Email: prema@Intra-Cellular Therapies Website: http://Retail Solutions.Bandspeed/wordpress/?page_id=5168     8  Roger the [facility] - [place]ishes Distance: 6.81 miles      Pickup   [address] [place] [address]  Language: English  Hours: Mon - Fri 5:00 PM - 6:00 PM  Fees: Free   Phone: (654) 115-2734 Email: contactus@M-Dot Network.org Website: https://www.M-Dot Network.org/          Hotlines and Helplines       Hotline - Housing crisis  9  [facility] (Main Office) Distance: 5.83 miles      Phone/Virtual   [address] [place] [address]  Language: English  Hours: Mon - Sun Open 24 Hours   Phone: (916) 937-5789 Email: info@[facility].org Website: http://MyCrowd.org     10  [facility] Distance: 11.92 miles      Phone/Virtual   [address] [place] [address]  Language: English  Hours: Mon - Sun Open 24 Hours   Phone: (968) 364-5591 Email: communications@oscs-mn.org Website: https://oscs-mn.org/oursaviourshousing/          Housing       Coordinated Entry access point  11  [facility] Distance: 10.87 miles      In-Person, Phone/Virtual   [address] [place] [address]  Language:  English  Hours: Mon - Fri 8:30 AM - 4:30 PM  Fees: Free   Phone: (577) 768-3193 Email: info@[facility].org Website: https://www.[facility].org/locations/[facility]/     12  [facility] ([facility] - Housing Services Distance: 11.88 miles      In-Person   [address] [place] [address]  Language: English  Hours: Mon - Fri 9:00 AM - 5:00 PM  Fees: Free   Phone: (751) 927-5974 Email: housing@[facility].org Website: http://www.[facility].org/housing     Drop-in center or day shelter  13  [facility] Distance: 12.14 miles      In-Person   [address] [place] [address]  Language: English  Hours: Mon - Fri 9:00 AM - 4:00 PM  Fees: Free   Phone: (798) 890-9574 Email: fronttobink@t-Art.AlizÃ© Pharma Website: http://t-Art.AlizÃ© Pharma     14  [facility] Distance: 12.3 miles      In-Person   [address] [place] [address]  Language: English  Hours: Mon - Fri 12:00 PM - 3:00 PM  Fees: Free   Phone: (659) 427-2908 Email: Lattice Power@LocalGuiding.Applico Website: http://Lattice Power.org/     Housing search assistance  15  [place] [employer] & [employer] - Rental Homes for Future Homebuyers Program Distance: 6.46 miles      Phone/Virtual   [address] [place] [address]  Language: English  Hours: Mon - Fri 8:00 AM - 4:30 PM  Fees: Free   Phone: (944) 977-6216 Email: hra@[facility].gov Website: https://www.[facility].gov/hra/[place]-housing-and-arvoavvikwrxu-anrxedvye-tys     16  [facility] Dis[facility] Distance: 7.36 miles      In-Person   [address] [place] [address]  Language: English  Hours: Mon - Fri 8:00 AM - 4:00 PM  Fees: Free, Insurance, Sliding Fee   Phone: (151) 420-6232 Email: safia@[facility]. Website: https://www.[facility]./HealthFamily/Disabilities     Shelter for families  17  [facility] Distance: 3.02 miles      In-Person   [address] [facility] [place] [address]  Language: English  Hours: Mon - Sun Open 24 Hours  Fees: Free, Sliding Fee   Phone: (827) 629-6180 Ext.1 Email: info@Medical Compression Systems Website: http://www.[facility].Tu Otro Super     Shelter for individuals  18  Community Action Partnership ([facility]) [facility] [place]  [facility] Distance: 5.15 miles      In-Person   [address] [place] [address]  Language: English, Sierra Leonean  Hours: Mon - Fri 8:00 AM - 4:30 PM  Fees: Free   Phone: (281) 374-7383 Email: info@Frictionless Commerce.Tu Otro Super Website: http://www.Frictionless Commerce.Tu Otro Super     19  [facility] - [facility] [place] [facility] - [facility] [place] [facility] Distance: 10.87 miles      In-Person   [address] [place] [address]  Language: English  Hours: Mon - Sun 5:00 PM - 10:00 AM  Fees: Free, Self Pay   Phone: (684) 356-6923 Email: info@Your Policy Manager.Tu Otro Super Website: https://www.Your Policy Manager.org/locations/[facility]-saint-paul/          Transportation       Free or low-cost transportation  20  Ant[employer]. Distance: 4.68 miles      In-Person   [address] [facility] [address] [place] [address]  Language: English  Hours: Mon - Fri 7:00 AM - 5:00 PM  Fees: Free   Phone: (585) 620-5611 Email: awcslfnibbql74@Rebel Coast Winery.Fullbridge Website: https://Wukong.com.Fullbridge/     21  [facility] Circulator Bus Distance: 8.33 miles      In-Person   [address] [place] [address]  Language: English  Hours: Tue 9:00 AM - 2:00 PM  Fees: Self Pay   Phone: (474) 437-4748 Email: info@Clusterize Website: http://www.3GV8 International Inc.org/     Transportation to medical appointments  22  [place] Mobility Distance: 3.66 miles      In-Person   [address] [place] [address]  Language: Upper sorbian, English, Oromo, Tuvaluan  Hours: Mon - Sat 5:00 AM - 9:00 PM  Fees: Insurance, Self Pay   Phone: (872) 535-4676 Email: info@M5 Networks Website: http://www.M5 Networks/      23  Assisted Transport Distance: 4.51 miles      In-Person   [address] [facility] [place] [address]  Language: English, Nicaraguan  Hours: Mon - Sun Appt. Only  Fees: Self Pay   Phone: (307) 172-9789 Email: christ@AdverseEvents Website: http://www.AdverseEvents/          Important Numbers & Websites       Emergency Services   911  [facility] Services   311  Poison Control   (843) 182-4654  Suicide Prevention Lifeline   (224) 939-7556 (TALK)  Child Abuse Hotline   (308) 537-7671 (4-A-Child)  Sexual Assault Hotline   (153) 667-9799 (HOPE)  National Runaway Safeline   (282) 237-1435 (RUNAWAY)  All-Options Talkline   (355) 358-9862  Substance Abuse Referral   (757) 204-1595 (HELP)

## 2024-02-07 ENCOUNTER — PATIENT OUTREACH (OUTPATIENT)
Dept: CARE COORDINATION | Facility: CLINIC | Age: 79
End: 2024-02-07
Payer: COMMERCIAL

## 2024-02-07 NOTE — PROGRESS NOTES
Clinic Care Coordination Contact  [facility]/Voicemail    Clinical Data: Care Coordinator Outreach    Outreach Documentation Number of Outreach Attempt   2/7/2024   2:59 PM 1       Left message on patient's friends voicemail with call back information and requested return call.    Plan: Care Coordinator will try to reach patient again in 10 business days.    ALDA Harry, B.S. [facility] Care Coordination  [facility]:  [place], [place] and [place]  [phone]  [email or web address]

## 2024-02-22 ENCOUNTER — PATIENT OUTREACH (OUTPATIENT)
Dept: CARE COORDINATION | Facility: CLINIC | Age: 79
End: 2024-02-22
Payer: COMMERCIAL

## 2024-02-22 NOTE — PROGRESS NOTES
Clinic Care Coordination Contact  [facility]/Voicemail    Clinical Data: Care Coordinator Outreach    Outreach Documentation Number of Outreach Attempt   2/7/2024   2:59 PM 1   2/22/2024  10:59 AM 2       Left message on patient's friends voicemail with call back information and requested return call.    Plan: Care Coordinator will try to reach patient again in 10 business days.    ALDA Harry, B.S. [facility] Care Coordination  [facility] Clinics:  [place], [place] and [place]  [phone]  [email or web address]

## 2024-02-28 ENCOUNTER — PATIENT OUTREACH (OUTPATIENT)
Dept: CARE COORDINATION | Facility: CLINIC | Age: 79
End: 2024-02-28
Payer: COMMERCIAL

## 2024-02-28 NOTE — PROGRESS NOTES
Clinic Care Coordination Contact    Situation: Patient chart reviewed by care coordinator.    Background: Care Coordination initial assessment and enrollment to Care Coordination was 1/8/2024. Patient centered goal(s) developed with participation from patient.  RN CC handed patient off to CHW for continued outreach every 30 days. Patient is not due for an updated complex care plan.     Assessment: CHW CC is in process of outreaching to patient, recently unable to contact patient x 2. Next CHW CC outreach attempt scheduled for 03/06/24.     Plan/Recommendations: CHW CC will disenroll patient if next outreach attempt is unsuccessful. RNCC will extend and complete clinical review after CHW CC has made contact with patient. RNCC will remain available as needed.     Viridiana Washington RN Care Coordinator  [facility] [place]  Email: [email or web address]  Phone: [phone]

## 2024-03-06 ENCOUNTER — TELEPHONE (OUTPATIENT)
Dept: CARE COORDINATION | Facility: CLINIC | Age: 79
End: 2024-03-06
Payer: COMMERCIAL

## 2024-03-06 ENCOUNTER — PATIENT OUTREACH (OUTPATIENT)
Dept: CARE COORDINATION | Facility: CLINIC | Age: 79
End: 2024-03-06
Payer: COMMERCIAL

## 2024-03-06 DIAGNOSIS — R41.89 COGNITIVE IMPAIRMENT: Primary | ICD-10-CM

## 2024-03-06 NOTE — TELEPHONE ENCOUNTER
Dr. Yip,     Patient/ friend/ caregiver are asking for assistance with filling out the N-919 form. However, this likely cannot be done in our clinic. Can you please send an Adult Mental Health Referral to:  Dr. Mason's office at [facility]   Complete Medical Waiver Form N-250  [address] [facility] [address]  [place] [address]  Office: [phone]   Fax: [phone]    Because of her cognitive impairment they will be able to complete an assessment and fill out citizenship forms. Fax referral, demographics, medication list, and diagnosis please too.     If you need more direction with this request, please contact BRIANA Randall.     Thank you!     Henny Billings, ALDA, B.S. Public Health  Clinic Care Coordination  [facility]:  [place], [place] and [place]  [phone]  [email or web address]

## 2024-03-06 NOTE — TELEPHONE ENCOUNTER
Please assist with scheduling PCP appointment to go over redness of legs, OT assessment, and getting an order for an wheelchair.     Thank you!    Henny Billings, ALDA, B.S. [facility] Care Coordination  [facility]:  [place], [place] and [place]  [phone]  [email or web address]

## 2024-03-06 NOTE — PROGRESS NOTES
Clinic Care Coordination Contact  Community Health Worker Follow Up    Care Gaps:     Health Maintenance Due   Topic Date Due    DTAP/TDAP/TD IMMUNIZATION (1 - Tdap) Never done    ZOSTER IMMUNIZATION (1 of 2) Never done    RSV VACCINE (Pregnancy & 60+) (1 - 1-dose 60+ series) Never done    Pneumococcal Vaccine: 65+ Years (1 of 1 - PCV) Never done    INFLUENZA VACCINE (1) Never done    COVID-19 Vaccine (1 - 2023-24 season) Never done       Care Plan:   Care Plan: Financial Wellbeing       Problem: Patient expresses financial resource strain       Long-Range Goal: Create an action plan to increase financial stability       Start Date: 1/16/2024 Expected End Date: 9/27/2024    This Visit's Progress: 10%    Note:     Barriers: language barrier, citizenship status  Strengths: motivated, engaged in care coordination, friends supportive   Patient expressed understanding of goal: Yes   Action steps to achieve this goal:  1. I will complete and return thu care application   2. I will review additional resources if/as needed. (Clues, [employer], etc.)  3. I will complete and return consent to communicate form                             Care Plan: Advance Care Plan       Problem: Patient does not have a valid Health Care Directive       Long-Range Goal: Complete Health Care Directive       Start Date: 1/16/2024 Expected End Date: 9/27/2024    This Visit's Progress: 10%    Note:     Barriers: language   Strengths: motivated, engaged in care coordination.   Patient expressed understanding of goal: yes  Action steps to achieve this goal:  1. RNCC will mail me a health care directive.   2. I will complete the health care directive. I can check with my bank about notop5 services, locate a nearby notary [email or web address]. I understand to make this document legal I will need to sign this in the presence of two witnesses who are not my listed health care agents, or having this notarized. (Dates  must match)    3. I will provide a copy of my health care directive to my care team when completed. I can email a copy to [email or web address].   4. I can visit [email or web address] website or call gael marquez at #[phone] for additional information or questions.   5. I will contact my care team with any barriers, questions or assistance needed with this goal. Care coordinator will remain available as needed.                              Care Plan: Specialty Referral       Problem: Patient is in need of specialty care       Long-Range Goal: Establish consistent relationship with specialist(s) as needed       Start Date: 1/16/2024 Expected End Date: 9/27/2024    This Visit's Progress: 10%    Note:     Barriers: language barrier, provider availability - wait time to complete appointments, etc.   Strengths: motivated, engaged in care coordination, friends supportive  Patient expressed understanding of goal: Yes   Action steps to achieve this goal:  1. I will schedule and complete recommended follow ups:    [facility] for neuropsych evaluation [phone]  Clinic/ providers who do citizenship cognitive screening: I believe patients need to have a court hearing date or have already received the N-648 form to be completed.  - Dr. Amee Ruffin - [phone]  - Dr. Mira Azul - [phone]  - Dr. Moreno Sams - [phone]  DEXA scan #[phone]  Occupational Therapy #[phone]   2. I will discuss, review, schedule and complete recommended overdue health maintenance with my Primary Care Provider.   3. I will contact my care team with questions, concerns, support needs. I will use the clinic as a resource and I understand I can contact my clinic with 24/7 after hours services available. Care Coordinator will remain available as needed.                                Intervention and Education during outreach: Barbara states that patients OT assessment found that she has  early alzheimer's and needs 24 hour care.  They are working on getting her citizenship, needs forms filled out by PCP. CHW explained that she will need the N-648 form for cognitive impairment disabling her to complete citizenship tests. [facility] providers will not complete this, PCP needs to send referral to the below:     Dr. Mason's office at [facility]   Complete Medical Waiver Form N-648  [address]  [place] [address]  Office: [phone]   Fax: [phone]    Barbara wants to meet with PCP to discus patients redness of legs, to go over OT results and recommendations, and get an order for a wheelchair.    Patient was prescribed Lipitor and they had no idea. Unable to afford prescriptions.   Patient has no income, lives for free, friends cannot afford meds either, pt cannot get help not being a citizen. This is the main priority.     CHW Plan: Sending message to care team, next outreach in one month.     ALDA Harry, B.S. [facility]  Clinic Care Coordination  [facility] Clinics:  [place], [place] and [place]  [phone]  [email or web address]

## 2024-03-07 ENCOUNTER — APPOINTMENT (OUTPATIENT)
Dept: INTERPRETER SERVICES | Facility: CLINIC | Age: 79
End: 2024-03-07
Payer: COMMERCIAL

## 2024-03-07 NOTE — CONFIDENTIAL NOTE
Mental health referral sent to:    Dr. Mason's office at [facility]   Complete Medical Waiver Form N-275 [address]  [place] [address]  Office: [phone]   Fax: [phone]     Hannah Yip MD   Internal Medicine - Pediatrics

## 2024-03-08 ENCOUNTER — APPOINTMENT (OUTPATIENT)
Dept: INTERPRETER SERVICES | Facility: CLINIC | Age: 79
End: 2024-03-08
Payer: COMMERCIAL

## 2024-03-12 NOTE — TELEPHONE ENCOUNTER
Upon Henny's request, hand faxed MH referral, demographics & med list to [facility] at [phone].    KIARA Anand  Patient Advocate Liason (PAL)  [facility]  Ph. [phone] / Fax. [phone]

## 2024-03-13 ENCOUNTER — APPOINTMENT (OUTPATIENT)
Dept: INTERPRETER SERVICES | Facility: CLINIC | Age: 79
End: 2024-03-13
Payer: COMMERCIAL

## 2024-03-13 ENCOUNTER — PATIENT OUTREACH (OUTPATIENT)
Dept: CARE COORDINATION | Facility: CLINIC | Age: 79
End: 2024-03-13
Payer: COMMERCIAL

## 2024-03-13 NOTE — PROGRESS NOTES
Clinic Care Coordination Contact  Care Coordination Clinician Chart review    Situation: Patient chart reviewed by care coordinator.       Background: Care Coordination initial assessment and enrollment took place 1/8/2024.   Upon initial assessment patient-centered goals were discussed and developed with participation from patient.  RNCC handed patient follow up and monitoring of goal progression off to [facility] for continued outreach every 30 days.        Assessment: Per chart review, patient outreach completed by CC CHW on 03/06/24.  Patient is actively working to accomplish goal(s) and patient's goal(s) remain(s) appropriate and relevant at this time.       Care Plan: Financial Wellbeing       Problem: Patient expresses financial resource strain       Long-Range Goal: Create an action plan to increase financial stability       Start Date: 1/16/2024 Expected End Date: 9/27/2024    This Visit's Progress: 10%    Note:     Barriers: language barrier, citizenship status  Strengths: motivated, engaged in care coordination, friends supportive   Patient expressed understanding of goal: Yes   Action steps to achieve this goal:  1. I will complete and return thu care application   2. I will review additional resources if/as needed. (Clues, [employer], etc.)  3. I will complete and return consent to communicate form                             Care Plan: Advance Care Plan       Problem: Patient does not have a valid Health Care Directive       Long-Range Goal: Complete Health Care Directive       Start Date: 1/16/2024 Expected End Date: 9/27/2024    This Visit's Progress: 10%    Note:     Barriers: language   Strengths: motivated, engaged in care coordination.   Patient expressed understanding of goal: yes  Action steps to achieve this goal:  1. RNCC will mail me a health care directive.   2. I will complete the health care directive. I can check with my bank about notary services, locate a nearby [employer]  [email or web address]. I understand to make this document legal I will need to sign this in the presence of two witnesses who are not my listed health care agents, or having this notarized. (Dates must match)    3. I will provide a copy of my health care directive to my care team when completed. I can email a copy to [email or web address].   4. I can visit [email or web address] website or call [facility] jimmy at #[phone] for additional information or questions.   5. I will contact my care team with any barriers, questions or assistance needed with this goal. Care coordinator will remain available as needed.                              Care Plan: Specialty Referral       Problem: Patient is in need of specialty care       Long-Range Goal: Establish consistent relationship with specialist(s) as needed       Start Date: 1/16/2024 Expected End Date: 9/27/2024    This Visit's Progress: 10%    Note:     Barriers: language barrier, provider availability - wait time to complete appointments, etc.   Strengths: motivated, engaged in care coordination, friends supportive  Patient expressed understanding of goal: Yes   Action steps to achieve this goal:  1. I will schedule and complete recommended follow ups:    [facility] for neuropsych evaluation [phone]  Clinic/ providers who do citizenship cognitive screening: I believe patients need to have a court hearing date or have already received the N-648 form to be completed.  - Dr. Amee Ruffin - [phone]  - Dr. Mira Azul - [phone]  - Dr. Moreno Sams - [phone]  DEXA scan #[phone]  Occupational Therapy #[phone]   2. I will discuss, review, schedule and complete recommended overdue health maintenance with my Primary Care Provider.   3. I will contact my care team with questions, concerns, support needs. I will use the clinic as a resource and I understand I can contact my clinic  with 24/7 after hours services available. Care Coordinator will remain available as needed.                                Plan/Recommendations: RNCC Clinical Assessments to be completed annually or as needed. Annual Assessment will be due 01/2025. The patient will continue working with Care Coordination to achieve goal(s) as above.  CHWCC will involve RNCC as needed or if patient is ready to transition to goal status of Maintenance.  RNCC will continue to review progress to goal(s) and CHWCC outreaches every 6 weeks.     Complex Care Plan:  Patient is not due for updated Plan of Care.  Care Plan updated and sent to patient: Clair Washington RN Care Coordinator  [facility] [place]  Email: [email or web address]  Phone: [phone]

## 2024-04-08 ENCOUNTER — PATIENT OUTREACH (OUTPATIENT)
Dept: CARE COORDINATION | Facility: CLINIC | Age: 79
End: 2024-04-08
Payer: COMMERCIAL

## 2024-04-08 NOTE — PROGRESS NOTES
Clinic Care Coordination Contact  [facility]/Voicemail    Clinical Data: Care Coordinator Outreach    Outreach Documentation Number of Outreach Attempt   4/8/2024  11:01 AM 1       Left message on patient's friends voicemail with call back information and requested return call.    Plan: Care Coordinator will try to reach patient again in 10 business days.    ALDA Harry, B.S. [facility] Care Coordination  [facility] Clinics:  [place], [place] and [place]  [phone]  [email or web address]

## 2024-04-24 ENCOUNTER — PATIENT OUTREACH (OUTPATIENT)
Dept: CARE COORDINATION | Facility: CLINIC | Age: 79
End: 2024-04-24
Payer: COMMERCIAL

## 2024-04-24 NOTE — TELEPHONE ENCOUNTER
Please try to contact Barbara at [phone] to assist with scheduling appointment for patient.     Today, she reports she never got any calls to schedule as noted below.    Thank you!    ALDA Harry, B.S. [facility] Care Coordination  [facility]:  [place], [place] and [place]  [phone]  [email or web address]

## 2024-04-24 NOTE — PROGRESS NOTES
Clinic Care Coordination Contact  Community Health Worker Follow Up    Care Gaps:     Health Maintenance Due   Topic Date Due    DTAP/TDAP/TD IMMUNIZATION (1 - Tdap) Never done    ZOSTER IMMUNIZATION (1 of 2) Never done    RSV VACCINE (Pregnancy & 60+) (1 - 1-dose 60+ series) Never done    Pneumococcal Vaccine: 65+ Years (1 of 1 - PCV) Never done    INFLUENZA VACCINE (1) Never done    COVID-19 Vaccine (1 - 2023-24 season) Never done       Care Plan:   Care Plan: Financial Wellbeing       Problem: Patient expresses financial resource strain       Long-Range Goal: Create an action plan to increase financial stability       Start Date: 1/16/2024 Expected End Date: 9/27/2024    This Visit's Progress: 10%    Note:     Barriers: language barrier, citizenship status  Strengths: motivated, engaged in care coordination, friends supportive   Patient expressed understanding of goal: Yes   Action steps to achieve this goal:  1. I will complete and return thu care application   2. I will review additional resources if/as needed. (Clues, [employer], etc.)  3. I will complete and return consent to communicate form                             Care Plan: Advance Care Plan       Problem: Patient does not have a valid Health Care Directive       Long-Range Goal: Complete Health Care Directive       Start Date: 1/16/2024 Expected End Date: 9/27/2024    This Visit's Progress: 10%    Note:     Barriers: language   Strengths: motivated, engaged in care coordination.   Patient expressed understanding of goal: yes  Action steps to achieve this goal:  1. RNCC will mail me a health care directive.   2. I will complete the health care directive. I can check with my bank about notTraining [employer] services, locate a nearby notary [email or web address]. I understand to make this document legal I will need to sign this in the presence of two witnesses who are not my listed health care agents, or having this notarized. (Dates  must match)    3. I will provide a copy of my health care directive to my care team when completed. I can email a copy to [email or web address].   4. I can visit [email or web address] website or call gael marquez at #[phone] for additional information or questions.   5. I will contact my care team with any barriers, questions or assistance needed with this goal. Care coordinator will remain available as needed.                              Care Plan: Specialty Referral       Problem: Patient is in need of specialty care       Long-Range Goal: Establish consistent relationship with specialist(s) as needed       Start Date: 1/16/2024 Expected End Date: 9/27/2024    This Visit's Progress: 10%    Note:     Barriers: language barrier, provider availability - wait time to complete appointments, etc.   Strengths: motivated, engaged in care coordination, friends supportive  Patient expressed understanding of goal: Yes   Action steps to achieve this goal:  1. I will schedule and complete recommended follow ups:    [facility] for neuropsych evaluation [phone]  Clinic/ providers who do citizenship cognitive screening: I believe patients need to have a court hearing date or have already received the N-648 form to be completed.  - Dr. Amee Ruffin - [phone]  - Dr. Mira Azul - [phone]  - Dr. Moreno Sams - [phone]  DEXA scan #[phone]  Occupational Therapy #[phone]   2. I will discuss, review, schedule and complete recommended overdue health maintenance with my Primary Care Provider.   3. I will contact my care team with questions, concerns, support needs. I will use the clinic as a resource and I understand I can contact my clinic with 24/7 after hours services available. Care Coordinator will remain available as needed.                                Intervention and Education during outreach: Spoke to Barbara, she states that they have an appointment at  [facility] with Dr. Mason on 5/6 at 1pm.   This will to to complete N-648 forms for citizenship.   CHW also inquired status of PCP follow up- clinic attempted to reach her 3 times during last outreach however she reports not receiving any of these calls.   CHW sent another message to team to assist.    CHW Plan: Next outreach in one month.     ALDA Harry, B.S. [facility]  Clinic Care Coordination  [facility] Clinics:  [place], [place] and [place]  [phone]  [email or web address]

## 2024-04-25 ENCOUNTER — PATIENT OUTREACH (OUTPATIENT)
Dept: CARE COORDINATION | Facility: CLINIC | Age: 79
End: 2024-04-25
Payer: COMMERCIAL

## 2024-04-25 NOTE — PROGRESS NOTES
Clinic Care Coordination Contact  Care Coordination Clinician Chart review    Situation: Patient chart reviewed by care coordinator.       Background: Care Coordination initial assessment and enrollment took place 1/8/2024.   Upon initial assessment patient-centered goals were discussed and developed with participation from patient.  RNCC handed patient follow up and monitoring of goal progression off to [facility] for continued outreach every 30 days.        Assessment: Per chart review, patient outreach completed by CC CHW on 04/24/2024.  Patient is actively working to accomplish goal(s) and patient's goal(s) remain(s) appropriate and relevant at this time.       Care Plan: Financial Wellbeing       Problem: Patient expresses financial resource strain       Long-Range Goal: Create an action plan to increase financial stability       Start Date: 1/16/2024 Expected End Date: 9/27/2024    This Visit's Progress: 10%    Note:     Barriers: language barrier, citizenship status  Strengths: motivated, engaged in care coordination, friends supportive   Patient expressed understanding of goal: Yes   Action steps to achieve this goal:  1. I will complete and return thu care application   2. I will review additional resources if/as needed. (Clues, [employer], etc.)  3. I will complete and return consent to communicate form                             Care Plan: Advance Care Plan       Problem: Patient does not have a valid Health Care Directive       Long-Range Goal: Complete Health Care Directive       Start Date: 1/16/2024 Expected End Date: 9/27/2024    This Visit's Progress: 10%    Note:     Barriers: language   Strengths: motivated, engaged in care coordination.   Patient expressed understanding of goal: yes  Action steps to achieve this goal:  1. RNCC will mail me a health care directive.   2. I will complete the health care directive. I can check with my bank about notary services, locate a nearby not[employer]all[employer]  [email or web address]. I understand to make this document legal I will need to sign this in the presence of two witnesses who are not my listed health care agents, or having this notarized. (Dates must match)    3. I will provide a copy of my health care directive to my care team when completed. I can email a copy to [email or web address].   4. I can visit [email or web address] website or call honor[facility] jimmy at #[phone] for additional information or questions.   5. I will contact my care team with any barriers, questions or assistance needed with this goal. Care coordinator will remain available as needed.                              Care Plan: Specialty Referral       Problem: Patient is in need of specialty care       Long-Range Goal: Establish consistent relationship with specialist(s) as needed       Start Date: 1/16/2024 Expected End Date: 9/27/2024    This Visit's Progress: 10%    Note:     Barriers: language barrier, provider availability - wait time to complete appointments, etc.   Strengths: motivated, engaged in care coordination, friends supportive  Patient expressed understanding of goal: Yes   Action steps to achieve this goal:  1. I will schedule and complete recommended follow ups:    [facility] for neuropsych evaluation [phone]  Clinic/ providers who do citizenship cognitive screening: I believe patients need to have a court hearing date or have already received the N-648 form to be completed.  - Dr. Amee Ruffin - [phone]  - Dr. Mira Azul - [phone]  - Dr. Moreno Sams - [phone]  DEXA scan #[phone]  Occupational Therapy #[phone]   2. I will discuss, review, schedule and complete recommended overdue health maintenance with my Primary Care Provider.   3. I will contact my care team with questions, concerns, support needs. I will use the clinic as a resource and I understand I can contact my clinic  with 24/7 after hours services available. Care Coordinator will remain available as needed.                                Plan/Recommendations: RNCC Clinical Assessments to be completed annually or as needed. Annual Assessment will be due 01/2025. The patient will continue working with Care Coordination to achieve goal(s) as above.  CHWCC will involve RNCC as needed or if patient is ready to transition to goal status of Maintenance.  RNCC will continue to review progress to goal(s) and CHWCC outreaches every 6 weeks.     Complex Care Plan:  Patient is due for updated Plan of Care.  Care Plan updated and sent to patient: Yes, via mail    Viridiana Washington RN Care Coordinator  [facility] [place]  Email: [email or web address]  Phone: [phone]

## 2024-04-25 NOTE — LETTER
[facility] CARE COORDINATION  [address]  [place] [address]     April 25, 2024        Dora Lopez  [address] [place] [address]          Dear Hakan Lester is an updated Patient Centered Plan of Care for your continued enrollment in Care Coordination. Please let us know if you have additional questions, concerns, or goals that we can assist with.    Sincerely,    Viridiana Washington RN Care Coordinator  [facility] [place]  Email: [email or web address]  Phone: [phone]              [facility]  Patient Centered Plan of Care  About Me:      Patient Name:  Tayler MALDONADO Nilo    YOB: 1945  Age:         79 year old   [place] MRN:    [id number] Telephone Information:  Home Phone [phone]   Mobile [phone]       Address:  [address] [place] [address] Email address:  No e-mail address on record      Emergency Contact(s)    Name Relationship Lgl Grd Work Phone Home Phone Mobile Phone   1. CLARISSA BROWN* Friend    [phone]   2. COLBY PAREDES Friend    [phone]           Primary language:  Danish     needed? Yes   [place] [facility]:  [phone] op. 1  Other communication barriers:Visual impairment; Glasses; [place] (Hard of hearing)    Preferred Method of Communication:     Current living arrangement: Other (lives with a friend)    Mobility Status/ Medical Equipment: Independent    Health Maintenance  Health Maintenance Reviewed: Due/Overdue   Health Maintenance Due   Topic Date Due    DTAP/TDAP/TD IMMUNIZATION (1 - Tdap) Never done    ZOSTER IMMUNIZATION (1 of 2) Never done    RSV VACCINE (Pregnancy & 60+) (1 - 1-dose 60+ series) Never done    Pneumococcal Vaccine: 65+ Years (1 of 1 - PCV) Never done    INFLUENZA VACCINE (1) Never done    COVID-19 Vaccine (1 - 2023-24 season) Never done      My Access Plan  Medical Emergency 911   Primary Clinic Line [facility]  [place] - [phone]   24 Hour Appointment Line [phone] or  [phone]) (toll-free)   24 Hour Nurse Line [phone] (toll-free)   Preferred Urgent Care [facility] - [place], [phone]     Preferred Hospital [facility]  [phone]     Preferred Pharmacy [id number] - [place], [place] - [address] [facility]     Behavioral Health Crisis Line The National Suicide Prevention Lifeline at 1-513.254.7360 or Text/Call 988     My Care Team Members  Patient Care Team         Relationship Specialty Notifications Start End    Hannah Yip MD PCP - General Internal Medicine  1/2/24     Phone: [phone] Fax: [phone]         [address] [place] [address]    Kika Waters RN Personal Advocate & Liaison (PAL)  Admissions 1/3/24     [phone]    Hannah Yip MD Assigned PCP   1/6/24     Phone: [phone] Fax: [phone]         [address] [place] [address]    Viridiana Washington, RN Lead Care Coordinator  Admissions 1/8/24     Phone: [phone]         Henny Billings W Community Health Worker Primary Care - CC Admissions 1/16/24     Phone: [phone]         Cici Acevedo Psy.D, LP Psychologist Neuropsychology  1/23/24     Phone: [phone] Fax: [phone]         [facility] [address] [address]              My Care Plans  Self Management and Treatment Plan    Care Plan  Care Plan: Financial Wellbeing       Problem: Patient expresses financial resource strain       Long-Range Goal: Create an action plan to increase financial stability       Start Date: 1/16/2024 Expected End Date: 9/27/2024    This Visit's Progress: 10%    Note:     Barriers: language barrier, citizenship status  Strengths: motivated, engaged in care coordination, friends supportive   Patient expressed understanding of goal: Yes   Action steps to achieve this goal:  1. I will complete and return thu care application    2. I will review additional resources if/as needed. (Clues, [employer], etc.)  3. I will complete and return consent to communicate form                             Care Plan: Advance Care Plan       Problem: Patient does not have a valid Health Care Directive       Long-Range Goal: Complete Health Care Directive       Start Date: 1/16/2024 Expected End Date: 9/27/2024    This Visit's Progress: 10%    Note:     Barriers: language   Strengths: motivated, engaged in care coordination.   Patient expressed understanding of goal: yes  Action steps to achieve this goal:  1. RNCC will mail me a health care directive.   2. I will complete the health care directive. I can check with my bank about notary services, locate a nearby notary [email or web address]. I understand to make this document legal I will need to sign this in the presence of two witnesses who are not my listed health care agents, or having this notarized. (Dates must match)    3. I will provide a copy of my health care directive to my care team when completed. I can email a copy to 24/7 [email or web address].   4. I can visit [email or web address] website or call [employer] at #[phone] for additional information or questions.   5. I will contact my care team with any barriers, questions or assistance needed with this goal. Care coordinator will remain available as needed.                              Care Plan: Specialty Referral       Problem: Patient is in need of specialty care       Long-Range Goal: Establish consistent relationship with specialist(s) as needed       Start Date: 1/16/2024 Expected End Date: 9/27/2024    This Visit's Progress: 10%    Note:     Barriers: language barrier, provider availability - wait time to complete appointments, etc.   Strengths: motivated, engaged in care coordination, friends supportive  Patient expressed understanding of goal: Yes   Action steps to achieve this  goal:  1. I will schedule and complete recommended follow ups:    [facility] for neuropsych evaluation [phone]  Clinic/ providers who do citizenship cognitive screening: I believe patients need to have a court hearing date or have already received the N-648 form to be completed.  - Dr. Amee Ruffin - [phone]  - Dr. Mira Azul - [phone]  - Dr. Moreno Sams - [phone]  DEXA scan #[phone]  Occupational Therapy #[phone]   2. I will discuss, review, schedule and complete recommended overdue health maintenance with my Primary Care Provider.   3. I will contact my care team with questions, concerns, support needs. I will use the clinic as a resource and I understand I can contact my clinic with 24/7 after hours services available. Care Coordinator will remain available as needed.                                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

## 2024-05-07 ENCOUNTER — NURSE TRIAGE (OUTPATIENT)
Dept: PEDIATRICS | Facility: CLINIC | Age: 79
End: 2024-05-07
Payer: COMMERCIAL

## 2024-05-07 NOTE — TELEPHONE ENCOUNTER
"Spoke to Barbara with patient. Patient gave permission for Barbara to speak to RN.    S: Leg and ankle swelling with pain.  B: Has had leg and ankle swelling for the last 4 months.  A: Patient is having moderate swelling with severe pain. Patient is refusing to walk due to swelling. Patient also have pain behind her right knee. This has been going on for over a month.  Denies: redness, warmth to touch, shortness of breath, chest pain, ect.  R: Patient would like to be seen today or tomorrow. Scheduled patient with extender.    Reason for Disposition   MILD to MODERATE ankle swelling (e.g., can't move joint normally, can't do usual activities)  (Exceptions: Itchy, localized swelling; swelling is chronic.)   Patient wants to be seen    Answer Assessment - Initial Assessment Questions  1. LOCATION: \"Which ankle is swollen?\" \"Where is the swelling?\"      Bilateral  2. ONSET: \"When did the swelling start?\"      4 months  3. SWELLING: \"How bad is the swelling?\" Or, \"How large is it?\" (e.g., mild, moderate, severe; size of localized swelling)     - NONE: No joint swelling.    - LOCALIZED: Localized; small area of puffy or swollen skin (e.g., insect bite, skin irritation).    - MILD: Joint looks or feels mildly swollen or puffy.    - MODERATE: Swollen; interferes with normal activities (e.g., work or school); decreased range of movement; may be limping.    - SEVERE: Very swollen; can't move swollen joint at all; limping a lot or unable to walk.      Moderate  4. PAIN: \"Is there any pain?\" If Yes, ask: \"How bad is it?\" (Scale 1-10; or mild, moderate, severe)    - NONE (0): no pain.    - MILD (1-3): doesn't interfere with normal activities.     - MODERATE (4-7): interferes with normal activities (e.g., work or school) or awakens from sleep, limping.     - SEVERE (8-10): excruciating pain, unable to do any normal activities, unable to walk.       9, refusing to walk  5. CAUSE: \"What do you think caused the ankle " "swelling?\"      Not sure what's causing it  6. OTHER SYMPTOMS: \"Do you have any other symptoms?\" (e.g., fever, chest pain, difficulty breathing, calf pain)      No  7. PREGNANCY: \"Is there any chance you are pregnant?\" \"When was your last menstrual period?\"      No    Protocols used: Ankle Swelling-A-GORDON BREAUX RN, BSN    "

## 2024-05-07 NOTE — TELEPHONE ENCOUNTER
Reason for Call:  Appointment Request    Patient requesting this type of appt:  acute visit    Requested provider: Hannah Yip    Reason patient unable to be scheduled: Not within requested timeframe    When does patient want to be seen/preferred time: 3-7 days    Comments: Patient is experiencing redness and swelling on her legs and ankles. She is also experiencing pain behind right knee. She also needs to discuss handicap designation    Okay to leave a detailed message?: Yes at Other phone number:  [phone]*    Call taken on 5/7/2024 at 9:32 AM by Cici Sumner

## 2024-05-08 ENCOUNTER — OFFICE VISIT (OUTPATIENT)
Dept: PEDIATRICS | Facility: CLINIC | Age: 79
End: 2024-05-08
Payer: COMMERCIAL

## 2024-05-08 VITALS
BODY MASS INDEX: 33.79 KG/M2 | HEIGHT: 61 IN | DIASTOLIC BLOOD PRESSURE: 60 MMHG | RESPIRATION RATE: 18 BRPM | WEIGHT: 179 LBS | OXYGEN SATURATION: 96 % | SYSTOLIC BLOOD PRESSURE: 128 MMHG | TEMPERATURE: 98 F | HEART RATE: 88 BPM

## 2024-05-08 DIAGNOSIS — R21 RASH AND NONSPECIFIC SKIN ERUPTION: ICD-10-CM

## 2024-05-08 DIAGNOSIS — R41.89 COGNITIVE DECLINE: Primary | ICD-10-CM

## 2024-05-08 DIAGNOSIS — R60.0 BILATERAL LOWER EXTREMITY EDEMA: ICD-10-CM

## 2024-05-08 PROCEDURE — 99214 OFFICE O/P EST MOD 30 MIN: CPT | Performed by: PHYSICIAN ASSISTANT

## 2024-05-08 RX ORDER — TRIAMCINOLONE ACETONIDE 1 MG/G
CREAM TOPICAL 2 TIMES DAILY
Qty: 80 G | Refills: 0 | Status: SHIPPED | OUTPATIENT
Start: 2024-05-08

## 2024-05-08 ASSESSMENT — PAIN SCALES - GENERAL: PAINLEVEL: EXTREME PAIN (8)

## 2024-05-08 NOTE — PROGRESS NOTES
"  Assessment & Plan     Cognitive decline  Proceed with walker to help with stability.  - Miscellaneous Order for DME - (Use only if a more specific DME order does not already exist    Rash and nonspecific skin eruption  Begin kenalog twice daily.   - triamcinolone (KENALOG) 0.1 % external cream; Apply topically 2 times daily    Bilateral lower extremity edema  Elevate legs as much as can throughout day.    Subjective   Tayler is a 79 year old, presenting for the following health issues:  HPI   Patient here with friend today. She lives with her friend. She is trying to obtain citizenship and medical coverage.   Patient is nonverbal and friend communicates on her behalf.    Patient has cognitive decline and was seen by OT that states she has symptoms consistent with alzheimers.     Patient has had LE edema bilaterally that worsens as the day goes on. She complains of pain in the evenings. No redness. No weight gain. No sob.     Also, patient has rash of the feet bilaterally. Itchy. Scaly. New.     Patient would like a walker since she is unstable. Also would like a handicap parking pass.       Review of Systems  Constitutional, HEENT, cardiovascular, pulmonary, gi and gu systems are negative, except as otherwise noted.      Objective    /60 (BP Location: Right arm, Patient Position: Sitting, Cuff Size: Adult Large)   Pulse 88   Temp 98  F (36.7  C) (Tympanic)   Resp 18   Ht 1.537 m (5' 0.5\")   Wt 81.2 kg (179 lb)   LMP  (LMP Unknown)   SpO2 96%   BMI 34.38 kg/m    Body mass index is 34.38 kg/m .  Physical Exam   GENERAL: alert and no distress  EYES: Eyes grossly normal to inspection, PERRL and conjunctivae and sclerae normal  HENT: ear canals and TM's normal  NECK: no adenopathy  RESP: lungs clear to auscultation - no rales, rhonchi or wheezes  CV: regular rate and rhythm, normal S1 S2, no S3 or S4  LE with mild edema. No erythema. No tenderness. No pitting. Scaling lesions noted of the bilateral " ankles    No results found for any visits on 05/08/24.      Signed Electronically by: Ibis Mendez PA-C

## 2024-05-29 ENCOUNTER — PATIENT OUTREACH (OUTPATIENT)
Dept: CARE COORDINATION | Facility: CLINIC | Age: 79
End: 2024-05-29
Payer: COMMERCIAL

## 2024-05-29 NOTE — PROGRESS NOTES
Clinic Care Coordination Contact  [facility]    Clinical Data: Care Coordinator Outreach    Outreach Documentation Number of Outreach Attempt   5/29/2024   2:55 PM 1     Barbara asks for call back tomorrow from 8-9am    Plan: Care Coordinator will try to reach patient again in 1-2 business days.    ALDA Harry, B.S. [facility] Care Coordination  [facility] Clinics:  [place], [place] and [place]  [phone]  [email or web address]

## 2024-05-30 NOTE — PROGRESS NOTES
Clinic Care Coordination Contact  [facility]/Voicemail    Clinical Data: Care Coordinator Outreach    Outreach Documentation Number of Outreach Attempt   5/29/2024   2:55 PM 1   5/30/2024   9:02 AM 2       Left message on patient's voicemail with call back information and requested return call.    Plan: Care Coordinator will try to reach patient again in 10 business days.    AL[place] Harry, B.S. [facility] Care Coordination  [facility] Clinics:  [place], [place] and [place]  [phone]  [email or web address]

## 2024-06-07 ENCOUNTER — PATIENT OUTREACH (OUTPATIENT)
Dept: CARE COORDINATION | Facility: CLINIC | Age: 79
End: 2024-06-07
Payer: COMMERCIAL

## 2024-06-07 NOTE — PROGRESS NOTES
Clinic Care Coordination Contact     Situation: Patient chart reviewed by care coordinator.     Background: Care Coordination initial assessment and enrollment to Care Coordination was 1/8/2024. Patient centered goal(s) developed with participation from patient.  RN CC handed patient off to CHW for continued outreach every 30 days. Patient is not due for an updated complex care plan. Annual Assessment will be due January 2025  .  Assessment: CHW CC is in process of outreaching to patient, recently unable to contact patient 5/29/2024. Next CHW CC outreach attempt scheduled for 6/13/2024.      Plan/Recommendations: CHW CC will route request to RNCC to review for disenrollment per standard work if next outreach attempt is unsuccessful. RNCC will extend and complete clinical review after CHW CC has made contact with patient. RNCC will remain available as needed.      Jessica Lopez, RN Care Coordinator  [facility] (on-site Thursdays)  [facility] (on-site Wednesdays)  [facility]  [email or web address]  [phone]

## 2024-06-17 ENCOUNTER — TELEPHONE (OUTPATIENT)
Dept: CARE COORDINATION | Facility: CLINIC | Age: 79
End: 2024-06-17
Payer: COMMERCIAL

## 2024-06-17 ENCOUNTER — PATIENT OUTREACH (OUTPATIENT)
Dept: CARE COORDINATION | Facility: CLINIC | Age: 79
End: 2024-06-17
Payer: COMMERCIAL

## 2024-06-17 NOTE — PROGRESS NOTES
Clinic Care Coordination Contact  Community Health Worker Follow Up    Care Gaps:     Health Maintenance Due   Topic Date Due    DTAP/TDAP/TD IMMUNIZATION (1 - Tdap) Never done    ZOSTER IMMUNIZATION (1 of 2) Never done    RSV VACCINE (Pregnancy & 60+) (1 - 1-dose 60+ series) Never done    Pneumococcal Vaccine: 65+ Years (1 of 1 - PCV) Never done    COVID-19 Vaccine (1 - 2023-24 season) Never done     Not discussed at this time.    Care Plan:   Care Plan: Financial Wellbeing       Problem: Patient expresses financial resource strain       Long-Range Goal: Create an action plan to increase financial stability       Start Date: 1/16/2024 Expected End Date: 9/27/2024    This Visit's Progress: 10% Recent Progress: 10%    Note:     Barriers: language barrier, citizenship status  Strengths: motivated, engaged in care coordination, friends supportive   Patient expressed understanding of goal: Yes   Action steps to achieve this goal:  1. I will complete and return thu care application   2. I will review additional resources if/as needed. (Clues, [employer], etc.)  3. I will complete and return consent to communicate form                             Care Plan: Advance Care Plan       Problem: Patient does not have a valid Health Care Directive       Long-Range Goal: Complete Health Care Directive       Start Date: 1/16/2024 Expected End Date: 9/27/2024    Recent Progress: 10%    Note:     Barriers: language   Strengths: motivated, engaged in care coordination.   Patient expressed understanding of goal: yes  Action steps to achieve this goal:  1. RNCC will mail me a health care directive.   2. I will complete the health care directive. I can check with my bank about notSigniant services, locate a nearby notLars[place] [email or web address]. I understand to make this document legal I will need to sign this in the presence of two witnesses who are not my listed health care agents, or having this notarized.  (Dates must match)    3. I will provide a copy of my health care directive to my care team when completed. I can email a copy to [email or web address].   4. I can visit [email or web address] website or call gael marquez at #[phone] for additional information or questions.   5. I will contact my care team with any barriers, questions or assistance needed with this goal. Care coordinator will remain available as needed.                                Intervention and Education during outreach: [place] patients friend, Barbara. She has naturalization forms complete but unsure where to send.  She is also asking for a wheelchair and walker order. Order for walker was placed during visit on 5/8, CHW explained they need to choose a DME supplier in network. Barbara unaware of process. Looked on [employer] [place] provider, [facility] Fax #[phone] is in network.   Routed message to provider to send orders.     CHW Plan: Next outreach in 2-3 weeks.     ALDA Harry, B.S. [facility]  Clinic Care Coordination  [facility] Clinics:  [place], [place] and [place]  [phone]  [email or web address]

## 2024-06-17 NOTE — TELEPHONE ENCOUNTER
Patient is requesting an new order for a wheelchair AND the walker order that was already placed on 5/8 to be sent to [facility] Fax #[phone].    Thank you    Henny Billings, ALDA, B.S. [facility] Care Coordination  [facility]:  [place], [place] and [place]  [phone]  [email or web address]

## 2024-07-08 ENCOUNTER — PATIENT OUTREACH (OUTPATIENT)
Dept: CARE COORDINATION | Facility: CLINIC | Age: 79
End: 2024-07-08
Payer: COMMERCIAL

## 2024-07-08 NOTE — PROGRESS NOTES
Clinic Care Coordination Contact  Community Health Worker Follow Up    Care Gaps:     Health Maintenance Due   Topic Date Due    DTAP/TDAP/TD IMMUNIZATION (1 - Tdap) Never done    ZOSTER IMMUNIZATION (1 of 2) Never done    RSV VACCINE (Pregnancy & 60+) (1 - 1-dose 60+ series) Never done    Pneumococcal Vaccine: 65+ Years (1 of 1 - PCV) Never done    COVID-19 Vaccine (1 - 2023-24 season) Never done     Not discussed    Care Plan:   Care Plan: Financial Wellbeing       Problem: Patient expresses financial resource strain       Long-Range Goal: Create an action plan to increase financial stability       Start Date: 1/16/2024 Expected End Date: 9/27/2024    This Visit's Progress: 20% Recent Progress: 10%    Note:     Barriers: language barrier, citizenship status  Strengths: motivated, engaged in care coordination, friends supportive   Patient expressed understanding of goal: Yes   Action steps to achieve this goal:  1. I will complete and return thu care application   2. I will review additional resources if/as needed. (Clues, [employer], etc.)  3. I will complete and return consent to communicate form                             Care Plan: Advance Care Plan       Problem: Patient does not have a valid Health Care Directive       Long-Range Goal: Complete Health Care Directive       Start Date: 1/16/2024 Expected End Date: 9/27/2024    Recent Progress: 10%    Note:     Barriers: language   Strengths: motivated, engaged in care coordination.   Patient expressed understanding of goal: yes  Action steps to achieve this goal:  1. RNCC will mail me a health care directive.   2. I will complete the health care directive. I can check with my bank about notConvrrt services, locate a nearby notLilly [email or web address]. I understand to make this document legal I will need to sign this in the presence of two witnesses who are not my listed health care agents, or having this notarized. (Dates must  match)    3. I will provide a copy of my health care directive to my care team when completed. I can email a copy to [email or web address].   4. I can visit [email or web address] website or call gael marquez at #[phone] for additional information or questions.   5. I will contact my care team with any barriers, questions or assistance needed with this goal. Care coordinator will remain available as needed.                                Intervention and Education during outreach: CHW notified Barbara of DME order that were sent to [facility] in [place] #[phone]. She will call to inquire.   Patient was also told that she needs another form filled out from PCP, maybe [address]? Everything was completed by Dr. Cartagena's office and one more form is needed from PCP. Barbara will double check the name of this, let CHW know, or drop off at the clinic to see if PCP is able to complete.     CHW Plan: Next outreach in one month.     ALDA Harry, B.S. [facility]  Clinic Care Coordination  [facility] Clinics:  [place], [place] and [place]  [phone]  [email or web address]

## 2024-07-15 ENCOUNTER — PATIENT OUTREACH (OUTPATIENT)
Dept: CARE COORDINATION | Facility: CLINIC | Age: 79
End: 2024-07-15
Payer: COMMERCIAL

## 2024-07-15 NOTE — LETTER
[facility] CARE COORDINATION  [address] [facility]  [place] [address]     July 15, 2024        Dora Lopez  [address]  [place] [address]          Dear Hakan Lester is an updated Patient Centered Plan of Care for your continued enrollment in Care Coordination. Please let us know if you have additional questions, concerns, or goals that we can assist with.    Sincerely,    Viridiana Washington RN Care Coordinator  [facility] [place] [place]  Email: [email or web address]  Phone: [phone]              [facility]  Patient Centered Plan of Care  About Me:        Patient Name:  Tayler Corcoran    YOB: 1945  Age:         79 year old   [place] MRN:    [id number] Telephone Information:  Home Phone [phone]   Mobile [phone]       Address:  [address]  [place] [address] Email address:  No e-mail address on record      Emergency Contact(s)    Name Relationship Lgl Grd Work Phone Home Phone Mobile Phone   1. CLARISSA BROWN* Friend    [phone]   2. COLBY PAREDES Friend    [phone]           Primary language:  Khmer     needed? Yes   [place] [employer]:  [phone] op. 1  Other communication barriers:Visual impairment; Glasses; [place] (Hard of hearing)    Preferred Method of Communication:     Current living arrangement: Other (lives with a friend)    Mobility Status/ Medical Equipment: Independent    Health Maintenance  Health Maintenance Reviewed: Due/Overdue   Health Maintenance Due   Topic Date Due    DTAP/TDAP/TD IMMUNIZATION (1 - Tdap) Never done    ZOSTER IMMUNIZATION (1 of 2) Never done    RSV VACCINE (Pregnancy & 60+) (1 - 1-dose 60+ series) Never done    Pneumococcal Vaccine: 65+ Years (1 of 1 - PCV) Never done    COVID-19 Vaccine (1 - 2023-24 season) Never done      My Access Plan  Medical Emergency 911   Primary Clinic Line [facility] - [phone]   24 Hour  Appointment Line [phone] or  [phone]) (toll-free)   24 Hour Nurse Line [phone] (toll-free)   Preferred Urgent Care [facility] - [place], [phone]     Preferred Hospital [facility]  [phone]     Preferred Pharmacy [facility] - [place], [place] - [address] [address]     Behavioral Health Crisis Line The National Suicide Prevention Lifeline at 1-987.345.1322 or Text/Call 558     My Care Team Members  Patient Care Team         Relationship Specialty Notifications Start End    Hannah Yip MD PCP - General Internal Medicine  1/2/24     Phone: [phone] Fax: [phone] [address] [facility] [address]    Hannah Yip MD Assigned PCP   1/6/24     Phone: [phone] Fax: [phone] [address] [facility] [address]    Viridiana Washington RN Lead Care Coordinator  Admissions 1/8/24     Phone: [phone]         Henny Billings, CHW Community Health Worker Primary Care - CC Admissions 1/16/24     Phone: [phone]         Cici Acevedo Psy.D, LP Psychologist Neuropsychology  1/23/24     Phone: [phone] Fax: [phone]         [address] [place] [address]              My Care Plans  Self Management and Treatment Plan    Care Plan  Care Plan: Financial Wellbeing       Problem: Patient expresses financial resource strain       Long-Range Goal: Create an action plan to increase financial stability       Start Date: 1/16/2024 Expected End Date: 12/31/2024    Recent Progress: 20%    Note:     Barriers: language barrier, citizenship status  Strengths: motivated, engaged in care coordination, friends supportive   Patient expressed understanding of goal: Yes   Action steps to achieve this goal:  1. I will complete and return thu care application   2. I will review additional resources if/as needed. (Clues, [employer], etc.)  3. I will complete and return consent to communicate  form                             Care Plan: Advance Care Plan       Problem: Patient does not have a valid Health Care Directive       Long-Range Goal: Complete Health Care Directive       Start Date: 1/16/2024 Expected End Date: 12/31/2024    Recent Progress: 10%    Note:     Barriers: language   Strengths: motivated, engaged in care coordination.   Patient expressed understanding of goal: yes  Action steps to achieve this goal:  1. RNCC will mail me a health care directive.   2. I will complete the health care directive. I can check with my bank about [employer] services, locate a nearby notary [email or web address]. I understand to make this document legal I will need to sign this in the presence of two witnesses who are not my listed health care agents, or having this notarized. (Dates must match)    3. I will provide a copy of my health care directive to my care team when completed. I can email a copy to [email or web address].   4. I can visit [email or web address] website or call [place] at #[phone] for additional information or questions.   5. I will contact my care team with any barriers, questions or assistance needed with this goal. Care coordinator will remain available as needed.                                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

## 2024-07-15 NOTE — PROGRESS NOTES
Clinic Care Coordination Contact  Care Coordination Clinician Chart review    Situation: Patient chart reviewed by care coordinator.       Background: Care Coordination initial assessment and enrollment took place 1/8/2024.   Upon initial assessment patient-centered goals were discussed and developed with participation from patient.  RNCC handed patient follow up and monitoring of goal progression off to [facility] for continued outreach every 30 days.        Assessment: Per chart review, patient outreach completed by CC CHW on 07/08/2024.  Patient is actively working to accomplish goal(s) and patient's goal(s) remain(s) appropriate and relevant at this time.       Care Plan: Financial Wellbeing       Problem: Patient expresses financial resource strain       Long-Range Goal: Create an action plan to increase financial stability       Start Date: 1/16/2024 Expected End Date: 12/31/2024    Recent Progress: 20%    Note:     Barriers: language barrier, citizenship status  Strengths: motivated, engaged in care coordination, friends supportive   Patient expressed understanding of goal: Yes   Action steps to achieve this goal:  1. I will complete and return thu care application   2. I will review additional resources if/as needed. (Clues, [employer], etc.)  3. I will complete and return consent to communicate form                             Care Plan: Advance Care Plan       Problem: Patient does not have a valid Health Care Directive       Long-Range Goal: Complete Health Care Directive       Start Date: 1/16/2024 Expected End Date: 12/31/2024    Recent Progress: 10%    Note:     Barriers: language   Strengths: motivated, engaged in care coordination.   Patient expressed understanding of goal: yes  Action steps to achieve this goal:  1. RNCC will mail me a health care directive.   2. I will complete the health care directive. I can check with my bank about notary services, locate a nearby notary  [email or web address]. I understand to make this document legal I will need to sign this in the presence of two witnesses who are not my listed health care agents, or having this notarized. (Dates must match)    3. I will provide a copy of my health care directive to my care team when completed. I can email a copy to [email or web address].   4. I can visit [email or web address] website or call gael marquez at #[phone] for additional information or questions.   5. I will contact my care team with any barriers, questions or assistance needed with this goal. Care coordinator will remain available as needed.                              Care Plan: Specialty Referral Completed 6/17/2024      Problem: Patient is in need of specialty care  Resolved 6/17/2024      Long-Range Goal: Establish consistent relationship with specialist(s) as needed  Completed 6/17/2024      Start Date: 1/16/2024 Expected End Date: 9/27/2024    Recent Progress: 10%    Note:     Barriers: language barrier, provider availability - wait time to complete appointments, etc.   Strengths: motivated, engaged in care coordination, friends supportive  Patient expressed understanding of goal: Yes   Action steps to achieve this goal:  1. I will schedule and complete recommended follow ups:    [facility] for neuropsych evaluation [phone]  Clinic/ providers who do citizenship cognitive screening: I believe patients need to have a court hearing date or have already received the N-648 form to be completed.  - Dr. Amee Ruffin - [phone]  - Dr. Mira Azul - [phone]  - Dr. Moreno Sams - [phone]  DEXA scan #[phone]  Occupational Therapy #[phone]   2. I will discuss, review, schedule and complete recommended overdue health maintenance with my Primary Care Provider.   3. I will contact my care team with questions, concerns, support needs. I will use the clinic as a  resource and I understand I can contact my clinic with 24/7 after hours services available. Care Coordinator will remain available as needed.                                Plan/Recommendations: RNCC Clinical Assessments to be completed annually or as needed. Annual Assessment will be due 01/2025. The patient will continue working with Care Coordination to achieve goal(s) as above.  CHWCC will involve RNCC as needed or if patient is ready to transition to goal status of Maintenance.  RNCC will continue to review progress to goal(s) and CHWCC outreaches every 6 weeks.     Complex Care Plan:  Patient is due for updated Plan of Care.  Care Plan updated and sent to patient: Yes, via mail    Viridiana Washington RN Care Coordinator  [facility] [place]  Email: [email or web address]  Phone: [phone]

## 2024-08-07 ENCOUNTER — PATIENT OUTREACH (OUTPATIENT)
Dept: CARE COORDINATION | Facility: CLINIC | Age: 79
End: 2024-08-07
Payer: COMMERCIAL

## 2024-08-07 NOTE — PROGRESS NOTES
Clinic Care Coordination Contact  [facility]    Clinical Data: Care Coordinator Outreach    Outreach Documentation Number of Outreach Attempt   8/7/2024   2:12 PM 1     Barbara notes that she is busy at work, she is best to reach on Monday or Tuesdays.    Plan: Care Coordinator will try to reach patient again in 3-5 business days.    Henny Billings, [place], B.S. [facility] Care Coordination  [facility]:  [place], [place]  [phone]  [email or web address]

## 2024-08-13 NOTE — PROGRESS NOTES
Clinic Care Coordination Contact  [facility]/Voicemail    Clinical Data: Care Coordinator Outreach    Outreach Documentation Number of Outreach Attempt   8/7/2024   2:12 PM 1   8/13/2024  10:41 AM 2       Left message on patients friend/ Stephanies voicemail with call back information and requested return call.  Still need CTC on file, LVM letting her know.     Plan: Care Coordinator will try to reach patient again in 10 business days.    Henny Billings, AL[place], B.S. [facility] Care Coordination  [facility]:  [place], [place]  [phone]  [email or web address]

## 2024-08-27 ENCOUNTER — PATIENT OUTREACH (OUTPATIENT)
Dept: CARE COORDINATION | Facility: CLINIC | Age: 79
End: 2024-08-27
Payer: COMMERCIAL

## 2024-08-27 NOTE — PROGRESS NOTES
Clinic Care Coordination Contact  [facility]/Voicemail    Clinical Data: Care Coordinator Outreach    Outreach Documentation Number of Outreach Attempt   8/7/2024   2:12 PM 1   8/13/2024  10:41 AM 2   8/27/2024   2:58 PM 3       Left message on patient's friends voicemail with call back information and requested return call.    Plan: CHW has been unsuccessful in multiple recent attempts to contact patient. Will route to lead CC to review for disenrollment and/or further direction per standard work. No further outreach will be made at this time.     [place] Harry, B.S. [facility]  Clinic Care Coordination  [facility]:  [place], [place]  [phone]  [email or web address]

## 2024-08-28 ENCOUNTER — PATIENT OUTREACH (OUTPATIENT)
Dept: CARE COORDINATION | Facility: CLINIC | Age: 79
End: 2024-08-28
Payer: COMMERCIAL

## 2024-08-28 NOTE — LETTER
[facility] CARE COORDINATION  [address] [facility]  [place] [address]     August 28, 2024    Tayler Corcoran  [address] [place] [address]      Dear Tayler,    I have been unsuccessful in reaching you since our last contact. At this time the Care Coordination team will make no further attempts to reach you, however this does not change your ability to continue receiving care from your providers at your primary care clinic. If you need additional support from a care coordinator in the future please contact me at [phone].    All of us at [facility] are invested in your health and are here to assist you in meeting your goals.     Sincerely,    Viridiana Washington RN Care Coordinator  [facility]  Email: [email or web address]  Phone: [phone]

## 2024-08-28 NOTE — PROGRESS NOTES
Clinic Care Coordination Contact    Situation: Patient chart reviewed by care coordinator.    Background: Patient is enrolled in clinic care coordination and followed to assist with goal(s) progression. CHW CC routed chart to RNCC for review to determine eligibility of disenrolling from Care Coordination per standard work.     Assessment: Per Chart Review, patient has been unreachable for follow up x 3 attempts with no further engagement or return calls.     Plan/Recommendations: Per Care Coordination standard work, patient will be disenrolled from Care Coordination. Care Coordinator will send disenrollment letter with care coordinator contact information via mail. Care Coordinator will do no further outreaches at this time. Care Coordinator will remain available as needed. New Care Coordination referral may be ordered with any future needs for resources, assistance, and/or support if patient is agreeable and remains engaged.        Viridiana Washington RN Care Coordinator  [facility] [place]  Email: [email or web address]  Phone: [phone]

## 2024-09-26 ENCOUNTER — APPOINTMENT (OUTPATIENT)
Dept: OPTOMETRY | Facility: CLINIC | Age: 79
End: 2024-09-26
Payer: COMMERCIAL

## 2024-09-26 ENCOUNTER — OFFICE VISIT (OUTPATIENT)
Dept: OPTOMETRY | Facility: CLINIC | Age: 79
End: 2024-09-26
Payer: COMMERCIAL

## 2024-09-26 DIAGNOSIS — H52.223 REGULAR ASTIGMATISM OF BOTH EYES: ICD-10-CM

## 2024-09-26 DIAGNOSIS — H25.13 NUCLEAR AGE-RELATED CATARACT, BOTH EYES: ICD-10-CM

## 2024-09-26 DIAGNOSIS — Z01.01 ENCOUNTER FOR EXAMINATION OF EYES AND VISION WITH ABNORMAL FINDINGS: Primary | ICD-10-CM

## 2024-09-26 DIAGNOSIS — H40.001 GLAUCOMA SUSPECT, RIGHT: ICD-10-CM

## 2024-09-26 DIAGNOSIS — H52.4 PRESBYOPIA: ICD-10-CM

## 2024-09-26 DIAGNOSIS — H53.40 VISUAL FIELD DEFECT: ICD-10-CM

## 2024-09-26 DIAGNOSIS — Z86.69 HISTORY OF OCCLUSION OF BRANCH RETINAL ARTERY: ICD-10-CM

## 2024-09-26 PROCEDURE — 92004 COMPRE OPH EXAM NEW PT 1/>: CPT | Performed by: OPTOMETRIST

## 2024-09-26 PROCEDURE — V2200 LENS SPHER BIFOC PLANO 4.00D: HCPCS | Mod: LT | Performed by: OPTOMETRIST

## 2024-09-26 PROCEDURE — 92015 DETERMINE REFRACTIVE STATE: CPT | Performed by: OPTOMETRIST

## 2024-09-26 PROCEDURE — V2020 VISION SVCS FRAMES PURCHASES: HCPCS | Performed by: OPTOMETRIST

## 2024-09-26 ASSESSMENT — REFRACTION_MANIFEST
OS_ADD: +2.75
OS_AXIS: 092
OS_CYLINDER: +3.25
OD_AXIS: 103
OD_ADD: +2.75
OD_SPHERE: +0.50
OS_SPHERE: PLANO
OD_CYLINDER: +2.25

## 2024-09-26 ASSESSMENT — CUP TO DISC RATIO
OS_RATIO: 0.45
OD_RATIO: 0.65

## 2024-09-26 ASSESSMENT — TONOMETRY
OD_IOP_MMHG: 22
OS_IOP_MMHG: 21
IOP_METHOD: APPLANATION

## 2024-09-26 ASSESSMENT — REFRACTION_WEARINGRX
OS_AXIS: 087
OS_SPHERE: -0.75
OD_ADD: +2.75
OS_CYLINDER: +3.00
OD_SPHERE: +0.50
OS_ADD: +2.75
OD_CYLINDER: +2.25
OD_AXIS: 099
SPECS_TYPE: BIFOCAL

## 2024-09-26 ASSESSMENT — SLIT LAMP EXAM - LIDS
COMMENTS: 1+ DERMATOCHALASIS, 1+ MEIBOMIAN GLAND DYSFUNCTION
COMMENTS: 1+ DERMATOCHALASIS, 1+ MEIBOMIAN GLAND DYSFUNCTION

## 2024-09-26 ASSESSMENT — CONF VISUAL FIELD
OD_INFERIOR_TEMPORAL_RESTRICTION: 0
OD_INFERIOR_NASAL_RESTRICTION: 0
OS_INFERIOR_NASAL_RESTRICTION: 1
OD_SUPERIOR_NASAL_RESTRICTION: 0
OD_SUPERIOR_TEMPORAL_RESTRICTION: 0
METHOD: COUNTING FINGERS
OS_INFERIOR_TEMPORAL_RESTRICTION: 1
OS_SUPERIOR_NASAL_RESTRICTION: 0
OD_NORMAL: 1
OS_SUPERIOR_TEMPORAL_RESTRICTION: 0

## 2024-09-26 ASSESSMENT — EXTERNAL EXAM - RIGHT EYE: OD_EXAM: NORMAL

## 2024-09-26 ASSESSMENT — VISUAL ACUITY
OD_CC: 20/30-2
METHOD: SNELLEN - LINEAR
OS_SC: 20/250
OD_SC: 20/200
OS_CC: 20/120
OS_CC+: -2
OS_SC: 20/200
OD_SC: 20/60
OS_CC: 20/40
CORRECTION_TYPE: GLASSES
OD_CC: 20/40

## 2024-09-26 ASSESSMENT — EXTERNAL EXAM - LEFT EYE: OS_EXAM: NORMAL

## 2024-09-26 NOTE — PROGRESS NOTES
Chief Complaint   Patient presents with    Annual Eye Exam    retinal artery branch occlusion      Accompanied by daughter Reba and granddaughter/ Barbara    -Retinal artery branch occlusion left eye - 2022 - visual field defect lower half - patient believes the defect is getting worse gradually over time     Last Eye Exam: 2-3 yrs - [facility]  Dilated Previously: Yes, side effects of dilation explained today    What are you currently using to see?  Glasses - FT BF - wears full time       Distance Vision Acuity: Satisfied with vision    Near Vision Acuity: Satisfied with vision while reading and using computer with glasses    Eye Comfort: watery a lot, redness in bottom of eyes  Do you use eye drops? : No  Occupation or Hobbies: home     Vivienne Staton  Optometry Assistant        Medical, surgical and family histories reviewed and updated 9/26/2024.       OBJECTIVE: See Ophthalmology exam    ASSESSMENT:    ICD-10-CM    1. Encounter for examination of eyes and vision with abnormal findings  Z01.01 EYE EXAM (SIMPLE-NONBILLABLE)      2. History of occlusion of branch retinal artery - Left Eye  Z86.69 EYE EXAM (SIMPLE-NONBILLABLE)      3. Visual field defect - Left Eye  H53.40 EYE EXAM (SIMPLE-NONBILLABLE)      4. Glaucoma suspect, right  H40.001 EYE EXAM (SIMPLE-NONBILLABLE)      5. Nuclear age-related cataract, both eyes  H25.13 EYE EXAM (SIMPLE-NONBILLABLE)      6. Regular astigmatism of both eyes  H52.223 EYE EXAM (SIMPLE-NONBILLABLE)     REFRACTION      7. Presbyopia  H52.4 EYE EXAM (SIMPLE-NONBILLABLE)     REFRACTION          PLAN:     Patient Instructions   History of branch retinal artery occlusion left eye.   Patient believes that the visual field defect is changing over time.   Glaucoma suspect right > left.  Return for baseline glaucoma testing with ophthalmology.    You have the start of mild cataracts.  You may notice some blurred vision or glare with night driving.  It is important that  you wear good sunglasses to protect your eyes from the ultraviolet light from the sun.     Updated glasses prescription provided today.   Allow 2 weeks to adapt to the new glasses.   Wear glasses full-time.       The effects of the dilating drops last for 4- 6 hours.  You will be more sensitive to light and vision will be blurry up close.  Mydriatic sunglasses were given if needed.     Gareth Cotto, MORAIMA  [address]  [place]  [address] [phone]

## 2024-09-26 NOTE — LETTER
9/26/2024      Tayler MALDONADO Nilo  [address] [place] [address]      Dear Colleague,    Thank you for referring your patient, Tayler Corcoran, to the [facility]. Please see a copy of my visit note below.    Chief Complaint   Patient presents with     Annual Eye Exam     retinal artery branch occlusion      Accompanied by daughter Reba and granddaughter/ Barbara    -Retinal artery branch occlusion left eye - 2022 - visual field defect lower half - patient believes the defect is getting worse gradually over time     Last Eye Exam: 2-3 yrs - [facility]  Dilated Previously: Yes, side effects of dilation explained today    What are you currently using to see?  Glasses - FT BF - wears full time       Distance Vision Acuity: Satisfied with vision    Near Vision Acuity: Satisfied with vision while reading and using computer with glasses    Eye Comfort: watery a lot, redness in bottom of eyes  Do you use eye drops? : No  Occupation or Hobbies: home     Vivienne Staton  Optometry Assistant        Medical, surgical and family histories reviewed and updated 9/26/2024.       OBJECTIVE: See Ophthalmology exam    ASSESSMENT:    ICD-10-CM    1. Encounter for examination of eyes and vision with abnormal findings  Z01.01 EYE EXAM (SIMPLE-NONBILLABLE)      2. History of occlusion of branch retinal artery - Left Eye  Z86.69 EYE EXAM (SIMPLE-NONBILLABLE)      3. Visual field defect - Left Eye  H53.40 EYE EXAM (SIMPLE-NONBILLABLE)      4. Glaucoma suspect, right  H40.001 EYE EXAM (SIMPLE-NONBILLABLE)      5. Nuclear age-related cataract, both eyes  H25.13 EYE EXAM (SIMPLE-NONBILLABLE)      6. Regular astigmatism of both eyes  H52.223 EYE EXAM (SIMPLE-NONBILLABLE)     REFRACTION      7. Presbyopia  H52.4 EYE EXAM (SIMPLE-NONBILLABLE)     REFRACTION          PLAN:     Patient Instructions   History of branch retinal artery occlusion left eye.   Patient believes that the visual field defect is  changing over time.   Glaucoma suspect right > left.  Return for baseline glaucoma testing with ophthalmology.    You have the start of mild cataracts.  You may notice some blurred vision or glare with night driving.  It is important that you wear good sunglasses to protect your eyes from the ultraviolet light from the sun.     Updated glasses prescription provided today.   Allow 2 weeks to adapt to the new glasses.   Wear glasses full-time.       The effects of the dilating drops last for 4- 6 hours.  You will be more sensitive to light and vision will be blurry up close.  Mydriatic sunglasses were given if needed.     Gareth Cotto, MORAIMA  [address] [place]  [address] [phone]       Again, thank you for allowing me to participate in the care of your patient.        Sincerely,        Gareth Cotto OD

## 2024-09-26 NOTE — PATIENT INSTRUCTIONS
History of branch retinal artery occlusion left eye.   Patient believes that the visual field defect is changing over time.   Glaucoma suspect right > left.  Return for baseline glaucoma testing with ophthalmology.    You have the start of mild cataracts.  You may notice some blurred vision or glare with night driving.  It is important that you wear good sunglasses to protect your eyes from the ultraviolet light from the sun.     Updated glasses prescription provided today.   Allow 2 weeks to adapt to the new glasses.   Wear glasses full-time.       The effects of the dilating drops last for 4- 6 hours.  You will be more sensitive to light and vision will be blurry up close.  Mydriatic sunglasses were given if needed.     Gareth Cotto, OD  [facility]  [address] [place] [address]. [place]  [address] [phone]

## 2024-10-23 ENCOUNTER — TELEPHONE (OUTPATIENT)
Dept: OPHTHALMOLOGY | Facility: CLINIC | Age: 79
End: 2024-10-23
Payer: COMMERCIAL

## 2024-10-23 NOTE — TELEPHONE ENCOUNTER
Spoke to Barbara, her grandchild. She comes with Tayler to all appointments and is her caregiver.  She will interpret for the patient at the appointment, she does not wish to use the video .

## 2024-10-23 NOTE — TELEPHONE ENCOUNTER
[facility]    Phone Message    May a detailed message be left on voicemail: yes     Reason for Call: Other: caller is returning a call due to a text received advising pts appt on 10/28/24 has been changed, writer berified pts chart appt date time and location. Please call Barbara to verify clinic does or does not have any changes needed to be made Thank you      Action Taken: Message routed to:  [facility]    Travel Screening: Not Applicable     Date of Service:

## 2024-10-28 ENCOUNTER — OFFICE VISIT (OUTPATIENT)
Dept: OPHTHALMOLOGY | Facility: CLINIC | Age: 79
End: 2024-10-28
Payer: COMMERCIAL

## 2024-10-28 DIAGNOSIS — H40.1130 PRIMARY OPEN ANGLE GLAUCOMA OF BOTH EYES, UNSPECIFIED GLAUCOMA STAGE: Primary | ICD-10-CM

## 2024-10-28 DIAGNOSIS — H34.232 RETINAL ARTERY BRANCH OCCLUSION OF LEFT EYE: ICD-10-CM

## 2024-10-28 PROCEDURE — 92002 INTRM OPH EXAM NEW PATIENT: CPT | Performed by: OPHTHALMOLOGY

## 2024-10-28 PROCEDURE — 92020 GONIOSCOPY: CPT | Performed by: OPHTHALMOLOGY

## 2024-10-28 PROCEDURE — 92083 EXTENDED VISUAL FIELD XM: CPT | Performed by: OPHTHALMOLOGY

## 2024-10-28 PROCEDURE — 92133 CPTRZD OPH DX IMG PST SGM ON: CPT | Performed by: OPHTHALMOLOGY

## 2024-10-28 RX ORDER — LATANOPROST 50 UG/ML
1 SOLUTION/ DROPS OPHTHALMIC EVERY EVENING
Qty: 2.5 ML | Refills: 11 | Status: SHIPPED | OUTPATIENT
Start: 2024-10-28

## 2024-10-28 ASSESSMENT — GONIOSCOPY
METHOD: ZEISS, FOUR MIRROR
OD_TEMPORAL: GRADE 2-3
OS_NASAL: GRADE 2-3
OS_SUPERIOR: GRADE 2-3
OS_TEMPORAL: GRADE 2-3
OS_INFERIOR: GRADE 2-3
OD_SUPERIOR: GRADE 2-3
OD_INFERIOR: GRADE 2-3
OD_NASAL: GRADE 2-3

## 2024-10-28 ASSESSMENT — VISUAL ACUITY
OD_CC: 20/25
OS_CC: 20/25
OS_CC+: -2
CORRECTION_TYPE: GLASSES
METHOD: SNELLEN - LINEAR

## 2024-10-28 ASSESSMENT — PACHYMETRY
OD_CT(UM): .512
OS_CT(UM): .499

## 2024-10-28 ASSESSMENT — TONOMETRY
IOP_METHOD: APPLANATION
OD_IOP_MMHG: 27
OS_IOP_MMHG: 23

## 2024-10-28 NOTE — PATIENT INSTRUCTIONS
Begin:   Latanoprost (green top) one drop in both eyes at bedtime     Return visit in 3 weeks for an intraocular pressure check. Call [phone] to schedule.     Shemar Lai M.D.  [phone]

## 2024-10-28 NOTE — PROGRESS NOTES
Current Eye Medications:  none.       Subjective:  Dr. Cotto recommended an evaluation of glaucoma suspect by Dr. Lai.  The patient also has a history of retinal artery branch occlusion, left eye, in 2022 resulting in a visual field defect.    The patient is here for a pressure check, OCT, visual field, and pachymetry.  No vision changes or concerns.  Occasionally she wakes with both eyes very red and mattery.  Also some epiphora occasionally.      Her granddaughter is here today to translate today's visit.       Objective:  See Ophthalmology Exam.       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  Corneas very thin on pachy.      Plan:  Begin:   Latanoprost (green top) one drop in both eyes at bedtime     Return visit in 3 weeks for an intraocular pressure check. Call [phone] to schedule.   May need additional meds.    Shemar Lai M.D.  [phone]

## 2024-10-28 NOTE — LETTER
10/28/2024      Tayler Corcoran  [address] [place] [address]      Dear Colleague,    Thank you for referring your patient, Tayler Corcoran, to the [facility]. Please see a copy of my visit note below.     Current Eye Medications:  none.       Subjective:  Dr. Cotto recommended an evaluation of glaucoma suspect by Dr. Lai.  The patient also has a history of retinal artery branch occlusion, left eye, in 2022 resulting in a visual field defect.    The patient is here for a pressure check, OCT, visual field, and pachymetry.  No vision changes or concerns.  Occasionally she wakes with both eyes very red and mattery.  Also some epiphora occasionally.      Her granddaughter is here today to translate today's visit.       Objective:  See Ophthalmology Exam.       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  Corneas very thin on pachy.      Plan:  Begin:   Latanoprost (green top) one drop in both eyes at bedtime     Return visit in 3 weeks for an intraocular pressure check. Call [phone] to schedule.   May need additional meds.    Shemar Lai M.D.  [phone]         Again, thank you for allowing me to participate in the care of your patient.        Sincerely,        Shemar Lai MD

## 2024-10-31 PROBLEM — H40.1130 PRIMARY OPEN ANGLE GLAUCOMA OF BOTH EYES, UNSPECIFIED GLAUCOMA STAGE: Status: ACTIVE | Noted: 2024-10-31

## 2024-10-31 ASSESSMENT — EXTERNAL EXAM - RIGHT EYE: OD_EXAM: NORMAL

## 2024-10-31 ASSESSMENT — EXTERNAL EXAM - LEFT EYE: OS_EXAM: NORMAL

## 2024-11-04 ENCOUNTER — OFFICE VISIT (OUTPATIENT)
Dept: OBGYN | Facility: CLINIC | Age: 79
End: 2024-11-04
Payer: COMMERCIAL

## 2024-11-04 VITALS
SYSTOLIC BLOOD PRESSURE: 110 MMHG | BODY MASS INDEX: 34.95 KG/M2 | WEIGHT: 178 LBS | DIASTOLIC BLOOD PRESSURE: 70 MMHG | HEIGHT: 60 IN

## 2024-11-04 DIAGNOSIS — N95.0 PMB (POSTMENOPAUSAL BLEEDING): Primary | ICD-10-CM

## 2024-11-04 DIAGNOSIS — N36.2 URETHRAL CARUNCLE: ICD-10-CM

## 2024-11-04 PROCEDURE — G2211 COMPLEX E/M VISIT ADD ON: HCPCS | Performed by: OBSTETRICS & GYNECOLOGY

## 2024-11-04 PROCEDURE — 99459 PELVIC EXAMINATION: CPT | Performed by: OBSTETRICS & GYNECOLOGY

## 2024-11-04 PROCEDURE — 99204 OFFICE O/P NEW MOD 45 MIN: CPT | Performed by: OBSTETRICS & GYNECOLOGY

## 2024-11-04 NOTE — PROGRESS NOTES
SUBJECTIVE:                                                   CC:  Patient presents with:  possible prolapse      HPI:  Tayler Corcoran is a 79 year old P5 who presents with her granddaughter Barbara to discuss recent episode of postmenopausal bleeding.  She went through menopause at age 48, did not have any postmenopausal bleeding between that time and 6 days ago.  6 days ago she was in the shower and stepped out then experienced a lot of bleeding with small little balls/clots that came out.  She laid down in the bed and her daughter took a look at the area and noted a red area near her mons vulva.    The patient does complain of occasional urgency and stress urinary incontinence symptoms.  She has had occasional pressure in the vagina, but nothing that affects quality of life.    She does have a history of early onset dementia.  History is provided by patient and her granddaughter.    Gyn History:  No LMP recorded (lmp unknown). Patient is postmenopausal.  No obstetric history on file.     Using menopause for contraception.    Last 3 Pap and HPV Results:      PMH, PSH, Soc Hx, Fam Hx, Meds, and allergies reviewed in Epic.    OBJECTIVE:     /70 (BP Location: Left arm, Patient Position: Chair, Cuff Size: Adult Regular)   Ht 1.524 m (5')   Wt 80.7 kg (178 lb)   LMP  (LMP Unknown)   Breastfeeding No   BMI 34.76 kg/m      Gen: Healthy appearing female, no acute distress, comfortable  Psych: mentation appears normal and affect bright  : Normal external female genitalia.  Vaginal epithelium is atrophic.  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  The vaginal tissue is redundant, stretchy and pendulous and the cervix is difficult to visualize.  On bimanual exam the cervix palpates anterior.    ASSESSMENT/PLAN:                                                      1. PMB (postmenopausal  bleeding) (Primary)  Reviewed images personally.  Discussed with patient that PMB is treated as cancer until proven otherwise, and in the setting of an endometrial lining greater than 4mm, sampling is generally recommended to get a pathology diagnosis.  This can be achieved in one of two ways: 1) office sampling with endometrial biopsy (limitations include incomplete sampling/blind sampling in addition to cramping as it is done without anesthesia), or 2) hysteroscopy dilation and curettage in the OR with anesthesia which is generally favored for patients with an irregular endometrium, fibroids, or a focal finding (disadvantage is that it can't be done in the clinic).  Reviewed the surgical procedure including risks of bleeding, infection, injury to surrounding organs, risk of uterine perforation, as well as discussed recovery expectations.  Reviewed that any subsequent episode of PMB should be worked up independently.  She has had all of her questions answered today and has elected to proceed with pevic US then make a follow-up plan  - US Pelvic Transabdominal and Transvaginal; Future    2. Urethral caruncle  Reviewed how these are normal in women who have been in menopause for a long time and they are often responsive to Estrace cream.  Once we have excluded any malignancy in the uterus, can proceed with vaginal estrogen cream.    Meenakshi Baca MD, MPH  Obstetrics and Gynecology

## 2024-11-04 NOTE — NURSING NOTE
Chief Complaint   Patient presents with    possible prolapse       Initial /70 (BP Location: Left arm, Patient Position: Chair, Cuff Size: Adult Regular)   Ht 1.524 m (5')   Wt 80.7 kg (178 lb)   LMP  (LMP Unknown)   Breastfeeding No   BMI 34.76 kg/m   Estimated body mass index is 34.76 kg/m  as calculated from the following:    Height as of this encounter: 1.524 m (5').    Weight as of this encounter: 80.7 kg (178 lb).  BP completed using cuff size: regular    Questioned patient about current smoking habits.  Pt. has never smoked.      No obstetric history on file.    The following HM Due: NONE    Danay Holman, CMA

## 2024-11-14 ENCOUNTER — ANCILLARY PROCEDURE (OUTPATIENT)
Dept: ULTRASOUND IMAGING | Facility: CLINIC | Age: 79
End: 2024-11-14
Attending: OBSTETRICS & GYNECOLOGY
Payer: COMMERCIAL

## 2024-11-14 DIAGNOSIS — N95.0 PMB (POSTMENOPAUSAL BLEEDING): ICD-10-CM

## 2024-11-14 PROCEDURE — 76856 US EXAM PELVIC COMPLETE: CPT | Performed by: OBSTETRICS & GYNECOLOGY

## 2024-11-14 PROCEDURE — 76830 TRANSVAGINAL US NON-OB: CPT | Performed by: OBSTETRICS & GYNECOLOGY

## 2024-11-18 ENCOUNTER — OFFICE VISIT (OUTPATIENT)
Dept: OPHTHALMOLOGY | Facility: CLINIC | Age: 79
End: 2024-11-18
Payer: COMMERCIAL

## 2024-11-18 DIAGNOSIS — H40.1130 PRIMARY OPEN ANGLE GLAUCOMA OF BOTH EYES, UNSPECIFIED GLAUCOMA STAGE: Primary | ICD-10-CM

## 2024-11-18 PROCEDURE — 92012 INTRM OPH EXAM EST PATIENT: CPT | Performed by: OPHTHALMOLOGY

## 2024-11-18 ASSESSMENT — VISUAL ACUITY
OD_CC: 20/25
CORRECTION_TYPE: GLASSES
OS_CC: 20/40
OD_CC+: -1
METHOD: SNELLEN - LINEAR

## 2024-11-18 ASSESSMENT — TONOMETRY
OS_IOP_MMHG: 16
OD_IOP_MMHG: 18
IOP_METHOD: APPLANATION

## 2024-11-18 ASSESSMENT — EXTERNAL EXAM - RIGHT EYE: OD_EXAM: NORMAL

## 2024-11-18 ASSESSMENT — EXTERNAL EXAM - LEFT EYE: OS_EXAM: NORMAL

## 2024-11-18 NOTE — Clinical Note
11/18/2024      Tayler Corcoran  [address] [place] [address]      Dear Colleague,    Thank you for referring your patient, Tayler Corcoran, to the [facility]. Please see a copy of my visit note below.     Current Eye Medications:  latanoprost - both eyes QHS - last dose: 10pm last night.      Subjective: 3 wk IOP check  - was able to start drops, uses nightly. Does get slight burning when instilling that only lasts for a few seconds.      Objective:  See Ophthalmology Exam.       Assessment:      Plan:   See Patient Instructions.         Again, thank you for allowing me to participate in the care of your patient.        Sincerely,        Shemar Lai MD

## 2024-11-18 NOTE — PROGRESS NOTES
" Current Eye Medications:  latanoprost - both eyes QHS - last dose: 10pm last night.      Subjective: 3 wk IOP check  - was able to start drops, uses nightly. Does get slight burning when instilling that only lasts for a few seconds.      Objective:  See Ophthalmology Exam.       Assessment:  Good initial decrease in intraocular pressure both eyes with Latanoprost.      Plan:  Continue:   Latanoprost (green top) every evening both eyes.    May use artificial tears up to four times a day (like Refresh Optive, Systane Balance, or TheraTears. Avoid \"get the red out\" drops and generic artifical tears).     Wait at least 5 minutes between drops if using more than one at a time.     Return visit in 4 months for an intraocular pressure check.     Shemar Lai M.D.  [phone]       "

## 2024-11-18 NOTE — PATIENT INSTRUCTIONS
"Continue:   Latanoprost (green top) every evening both eyes.    May use artificial tears up to four times a day (like Refresh Optive, Systane Balance, or TheraTears. Avoid \"get the red out\" drops and generic artifical tears).     Wait at least 5 minutes between drops if using more than one at a time.     Return visit in 4 months for an intraocular pressure check.     Shemar Lai M.D.  [phone]    "

## 2024-11-21 ENCOUNTER — APPOINTMENT (OUTPATIENT)
Dept: INTERPRETER SERVICES | Facility: CLINIC | Age: 79
End: 2024-11-21
Payer: COMMERCIAL

## 2024-12-04 ENCOUNTER — TELEPHONE (OUTPATIENT)
Dept: OBGYN | Facility: CLINIC | Age: 79
End: 2024-12-04
Payer: COMMERCIAL

## 2024-12-05 ENCOUNTER — TELEPHONE (OUTPATIENT)
Dept: OBGYN | Facility: CLINIC | Age: 79
End: 2024-12-05
Payer: COMMERCIAL

## 2024-12-05 NOTE — TELEPHONE ENCOUNTER
Spoke with patients granddaughter Barbara who was with Tayler and interpreting    Requesting to go over US results from 11/14.     Advised of Dr Turner notes and recommendation for hysteroscopy D&C.     Would like visit to discuss in person.     Appt scheduled for 12/9.     KIARA Morgan

## 2024-12-09 ENCOUNTER — OFFICE VISIT (OUTPATIENT)
Dept: OBGYN | Facility: CLINIC | Age: 79
End: 2024-12-09
Payer: COMMERCIAL

## 2024-12-09 VITALS
DIASTOLIC BLOOD PRESSURE: 80 MMHG | HEIGHT: 60 IN | BODY MASS INDEX: 35.14 KG/M2 | SYSTOLIC BLOOD PRESSURE: 130 MMHG | WEIGHT: 179 LBS

## 2024-12-09 DIAGNOSIS — N84.0 ENDOMETRIAL POLYP: ICD-10-CM

## 2024-12-09 DIAGNOSIS — N95.0 PMB (POSTMENOPAUSAL BLEEDING): Primary | ICD-10-CM

## 2024-12-09 PROCEDURE — G2211 COMPLEX E/M VISIT ADD ON: HCPCS | Performed by: OBSTETRICS & GYNECOLOGY

## 2024-12-09 PROCEDURE — 99214 OFFICE O/P EST MOD 30 MIN: CPT | Performed by: OBSTETRICS & GYNECOLOGY

## 2024-12-09 NOTE — PROGRESS NOTES
SUBJECTIVE:                                                   CC:  Patient presents with:  Follow Up      HPI:  Tayler Corcoran is a 79 year old  with PMB and urethral caruncle who also has a pelvic US showing prominent EMS with three hyperechoic areas.  Here today with her granddaughter Barbara.  See note from 24.    Gyn History:  No LMP recorded (lmp unknown). Patient is postmenopausal.       PMH, PSH, Soc Hx, Fam Hx, Meds, and allergies reviewed in Epic.    OBJECTIVE:     /80 (BP Location: Right arm, Patient Position: Chair, Cuff Size: Adult Large)   Ht 1.524 m (5')   Wt 81.2 kg (179 lb)   LMP  (LMP Unknown)   Breastfeeding No   BMI 34.96 kg/m      Gen: Healthy appearing female, no acute distress, comfortable  Psych: mentation appears normal and affect bright  : see last note    Test Results:  Endo cav: 7.0 mm       Prominent and Hyperechoic areas -  1) 0.7 x 0.3 cm, 2) 0.9 x 0.5 cm, 3) 1.5 x 0.4 cm  Cervix: wnl  Thickened endometrial stripe in a postmenopausal woman: recommend endometrial sampling.  Hyperechoic intracavitary lesion (endometrial polyp vs fibroid); consider sonohysterography versus hysteroscopy as clinically indicated.       ASSESSMENT/PLAN:                                                      1. PMB (postmenopausal bleeding) (Primary)  Reviewed images personally.  Recommend sampling with hysteroscopy D&C, pt prefers MAC anesthesia.  Discussed risks of surgery including bleeding to the point of requiring blood transfusion, infection, injury to surrounding organs.  Discussed recovery expectations and that this is typically a same-day surgery.  Pt has had all questions answered and has agreed to proceed.  Will meet with PCP for pre-op physical with PCP.  Will sign consent form day of the procedure.  Pre-op orders entered.   - Case Request: HYSTEROSCOPY, WITH DILATION AND CURETTAGE OF UTERUS using myosure    2. Endometrial polyp  - Case Request: HYSTEROSCOPY, WITH  DILATION AND CURETTAGE OF UTERUS using myosure      Meenakshi Baca MD, MPH  Obstetrics and Gynecology

## 2024-12-09 NOTE — NURSING NOTE
Chief Complaint   Patient presents with    Follow Up       Initial /80 (BP Location: Right arm, Patient Position: Chair, Cuff Size: Adult Large)   Ht 1.524 m (5')   Wt 81.2 kg (179 lb)   LMP  (LMP Unknown)   Breastfeeding No   BMI 34.96 kg/m   Estimated body mass index is 34.96 kg/m  as calculated from the following:    Height as of this encounter: 1.524 m (5').    Weight as of this encounter: 81.2 kg (179 lb).  BP completed using cuff size: large    Questioned patient about current smoking habits.  Pt. has never smoked.          The following HM Due: NONE  Danay Holman, CMA

## 2024-12-10 ENCOUNTER — TELEPHONE (OUTPATIENT)
Dept: OBGYN | Facility: CLINIC | Age: 79
End: 2024-12-10

## 2024-12-10 NOTE — TELEPHONE ENCOUNTER
Left message with  assistance for patient to return the call to schedule surgery.    **Patient's daughter returned the call and stated they will call back when ready to schedule.

## 2024-12-10 NOTE — TELEPHONE ENCOUNTER
Patient Name: Tayler Corcoran   MRN: [id number]   Case#: [id number]   Surgeons and Role:      * Meenakshi Baca MD - Primary   Date requested: * No dates entered *   Location: [facility] OR   Procedure(s):   HYSTEROSCOPY, WITH DILATION AND CURETTAGE OF UTERUS using myosure (N/A)

## 2025-03-31 DIAGNOSIS — E78.5 HYPERLIPIDEMIA LDL GOAL <100: ICD-10-CM

## 2025-04-01 DIAGNOSIS — E78.5 HYPERLIPIDEMIA LDL GOAL <100: ICD-10-CM

## 2025-04-01 RX ORDER — ATORVASTATIN CALCIUM 20 MG/1
20 TABLET, FILM COATED ORAL DAILY
Qty: 30 TABLET | Refills: 1 | Status: SHIPPED | OUTPATIENT
Start: 2025-04-01 | End: 2025-04-01

## 2025-04-01 NOTE — TELEPHONE ENCOUNTER
Clinic RN: Please investigate patient's chart or contact patient if the information cannot be found because patient should have run out of this medication around April 2024. Break in medication >90days. Confirm patient is taking this medication as prescribed. Document findings and route refill encounter to provider for approval or denial.     Mona MELTON RN  [facility]

## 2025-04-01 NOTE — TELEPHONE ENCOUNTER
RN attempted to reach patient via  (ID [id number]).  Tayler provided verbal consent to communicate with caregiver, Barbara.     Barbara states that no one ever took patient to  medication last year, and now that she is under barbara's care she can help with medications and have her start.     Routing request to last prescriber Ibis Mendez PA-C.     RN transferred to central scheduling to schedule annual physical. Encouraged patient to fill out CTC for barbara when in clinic next.    Pamela Garcia RN

## 2025-04-02 RX ORDER — ATORVASTATIN CALCIUM 20 MG/1
20 TABLET, FILM COATED ORAL DAILY
Qty: 30 TABLET | Refills: 1 | Status: SHIPPED | OUTPATIENT
Start: 2025-04-02 | End: 2025-04-02

## 2025-04-02 RX ORDER — ATORVASTATIN CALCIUM 20 MG/1
20 TABLET, FILM COATED ORAL DAILY
Qty: 90 TABLET | Refills: 1 | Status: SHIPPED | OUTPATIENT
Start: 2025-04-02

## 2025-06-20 ENCOUNTER — RESULTS FOLLOW-UP (OUTPATIENT)
Dept: INTERNAL MEDICINE | Facility: CLINIC | Age: 80
End: 2025-06-20

## 2025-06-20 DIAGNOSIS — E55.9 VITAMIN D DEFICIENCY: Primary | ICD-10-CM

## 2025-06-20 DIAGNOSIS — E78.5 HYPERLIPIDEMIA LDL GOAL <100: ICD-10-CM

## 2025-06-20 RX ORDER — CHOLECALCIFEROL (VITAMIN D3) 50 MCG
1 TABLET ORAL DAILY
Qty: 90 TABLET | Refills: 1 | Status: SHIPPED | OUTPATIENT
Start: 2025-06-20

## 2025-06-20 RX ORDER — ATORVASTATIN CALCIUM 20 MG/1
20 TABLET, FILM COATED ORAL DAILY
Qty: 90 TABLET | Refills: 1 | Status: SHIPPED | OUTPATIENT
Start: 2025-06-20

## 2025-06-23 ENCOUNTER — PATIENT OUTREACH (OUTPATIENT)
Dept: CARE COORDINATION | Facility: CLINIC | Age: 80
End: 2025-06-23
Payer: COMMERCIAL

## 2025-07-16 ENCOUNTER — OFFICE VISIT (OUTPATIENT)
Dept: PODIATRY | Facility: CLINIC | Age: 80
End: 2025-07-16
Attending: NURSE PRACTITIONER
Payer: COMMERCIAL

## 2025-07-16 VITALS — WEIGHT: 173 LBS | BODY MASS INDEX: 31.83 KG/M2 | HEIGHT: 62 IN

## 2025-07-16 DIAGNOSIS — S90.422A BLISTER OF LEFT GREAT TOE, INITIAL ENCOUNTER: Primary | ICD-10-CM

## 2025-07-16 DIAGNOSIS — I73.9 PAD (PERIPHERAL ARTERY DISEASE): ICD-10-CM

## 2025-07-16 DIAGNOSIS — Z00.00 ENCOUNTER FOR PREVENTIVE HEALTH EXAMINATION: ICD-10-CM

## 2025-07-16 DIAGNOSIS — B35.1 FUNGAL NAIL INFECTION: ICD-10-CM

## 2025-07-16 DIAGNOSIS — B35.1 ONYCHOMYCOSIS: ICD-10-CM

## 2025-07-16 NOTE — PROGRESS NOTES
CC: Pain to toes     HPI: Tayler Corcoran is a 80 year old female who presents to clinic for chief concern of pain to her toes, and thickened nails.  Patient presents with her granddaughter who is her caretaker.  The patient has a history of Alzheimer's and is nonverbal.  The patient's granddaughter, caretaker interprets for her in clinic today.    No past surgical history on file.  No past medical history on file.  Medications:  Current Outpatient Medications   Medication Sig Dispense Refill    atorvastatin (LIPITOR) 20 MG tablet Take 1 tablet (20 mg) by mouth daily. 90 tablet 1    fluticasone (FLONASE) 50 MCG/ACT nasal spray Spray 1 spray into both nostrils daily. 9.9 mL 1    latanoprost (XALATAN) 0.005 % ophthalmic solution Place 1 drop into both eyes every evening. Wait at least 5 minutes between drops if using more than one at a time. 2.5 mL 11    triamcinolone (KENALOG) 0.1 % external cream Apply topically 2 times daily. 80 g 0    vitamin D3 (CHOLECALCIFEROL) 50 mcg (2000 units) tablet Take 1 tablet (50 mcg) by mouth daily. 90 tablet 1     Allergies:  Seafood and Sulfa antibiotics  Social History     Socioeconomic History    Marital status: Single     Spouse name: Not on file    Number of children: Not on file    Years of education: Not on file    Highest education level: Not on file   Occupational History    Not on file   Tobacco Use    Smoking status: Never     Passive exposure: Never    Smokeless tobacco: Never   Vaping Use    Vaping status: Never Used   Substance and Sexual Activity    Alcohol use: Not on file    Drug use: Not on file    Sexual activity: Not on file   Other Topics Concern    Not on file   Social History Narrative    Not on file     Social Drivers of Health     Financial Resource Strain: Low Risk  (6/19/2025)    Financial Resource Strain     Within the past 12 months, have you or your family members you live with been unable to get utilities (heat, electricity) when it was really needed?: No    Food Insecurity: Low Risk  (6/19/2025)    Food Insecurity     Within the past 12 months, did you worry that your food would run out before you got money to buy more?: No     Within the past 12 months, did the food you bought just not last and you didn t have money to get more?: No   Transportation Needs: Low Risk  (6/19/2025)    Transportation Needs     Within the past 12 months, has lack of transportation kept you from medical appointments, getting your medicines, non-medical meetings or appointments, work, or from getting things that you need?: No   Physical Activity: Unknown (6/19/2025)    Exercise Vital Sign     Days of Exercise per Week: 0 days     Minutes of Exercise per Session: Not on file   Stress: No Stress Concern Present (6/19/2025)    [employer] - Occupational Stress Questionnaire     Feeling of Stress : Not at all   Social Connections: Unknown (6/19/2025)    Social Connection and Isolation Panel [NHANES]     Frequency of Communication with Friends and Family: Not on file     Frequency of Social Gatherings with Friends and Family: Never     Attends Rastafari Services: Not on file     Active Member of Clubs or Organizations: Not on file     Attends Club or Organization Meetings: Not on file     Marital Status: Not on file   Interpersonal Safety: Not on file   Housing Stability: Low Risk  (6/19/2025)    Housing Stability     Do you have housing? : Yes     Are you worried about losing your housing?: No     Family History   Problem Relation Age of Onset    Glaucoma No family hx of     Macular Degeneration No family hx of        Medical records were reviewed and are summarized above.    Review of Systems    PHYSICAL EXAM:  Focused lower extremity physical exam:     Derm: Skin is cool, thin, dry, intact.  Small hemorrhagic blister appreciated to the distal tuft of the left hallux digit.  Upon debridement of this blister there is a small superficial ulceration to this area with  no acute sign of infection.  Nails are thickened, elongated, discolored with subungual debris x 10.    Vasc: Dorsalis pedis pulses, 1/4 bilateral. Posterior tibial pulses 1/4 bilateral. Cap fill time < 3 seconds to the digits.  Mild nonpitting edema and varicosities appreciated to the hindfoot and ankles bilaterally. Hair absent present to the toes.     Neuro: Reactive to light touch bilaterally  MSK:   - Seemingly tender to palpation to the elongated nails, especially the hallux nails.  Compartments soft and compressible.    Assessment:  - Painful onychomycosis x 10  - PAD  - Hemorrhagic blister, right hallux    Plan:  - Patient was seen and evaluated in clinic by myself.   - Hemorrhagic blister, right hallux:  Discussed hemorrhagic blister.  This area was deroofed without complication.  Small superficial ulceration remains.  Dry sterile dressing was placed to the area.  Discussed changing this dressing daily until this has scabbed over.    - Painful onychomycosis:  Discussed the patient's onychomycosis.  Discussed the nail dystrophy.  Discussed the subungual debris.  Discussed the difference between traumatic dystrophy of the nail versus onychomycosis of the nail.  Discussed topical versus oral antifungals.  Discussed antifungal treatment of the nail versus trimming and debridement of the nail.    - PAD:  Discussed advanced trophic changes of the skin including decreased hair growth, dystrophic nail changes, pigmentary changes and skin temperature texture changes of the bilateral feet and ankles.  Discussed edematous changes as well as temperature changes of the feet.    - Procedure - nail debridement 6 or greater:  After verbal and written consent were obtained, all nails, x 10, were manually debrided without complication.  Patient noted immediate relief.  Patient tolerated procedure well.    - Discussed with the caretaker that the patient may follow-up in 9 to 12 weeks or sooner symptoms arise.    Barrera  KELLY Gauthier

## 2025-07-16 NOTE — LETTER
7/16/2025      Tayler Corcoran  [address] [place] [address]      Dear Colleague,    Thank you for referring your patient, Tayler Corcoran, to the [facility]. Please see a copy of my visit note below.    CC: Pain to toes     HPI: Tayler Corcoran is a 80 year old female who presents to clinic for chief concern of pain to her toes, and thickened nails.  Patient presents with her granddaughter who is her caretaker.  The patient has a history of Alzheimer's and is nonverbal.  The patient's granddaughter, caretaker interprets for her in clinic today.    No past surgical history on file.  No past medical history on file.  Medications:  Current Outpatient Medications   Medication Sig Dispense Refill     atorvastatin (LIPITOR) 20 MG tablet Take 1 tablet (20 mg) by mouth daily. 90 tablet 1     fluticasone (FLONASE) 50 MCG/ACT nasal spray Spray 1 spray into both nostrils daily. 9.9 mL 1     latanoprost (XALATAN) 0.005 % ophthalmic solution Place 1 drop into both eyes every evening. Wait at least 5 minutes between drops if using more than one at a time. 2.5 mL 11     triamcinolone (KENALOG) 0.1 % external cream Apply topically 2 times daily. 80 g 0     vitamin D3 (CHOLECALCIFEROL) 50 mcg (2000 units) tablet Take 1 tablet (50 mcg) by mouth daily. 90 tablet 1     Allergies:  Seafood and Sulfa antibiotics  Social History     Socioeconomic History     Marital status: Single     Spouse name: Not on file     Number of children: Not on file     Years of education: Not on file     Highest education level: Not on file   Occupational History     Not on file   Tobacco Use     Smoking status: Never     Passive exposure: Never     Smokeless tobacco: Never   Vaping Use     Vaping status: Never Used   Substance and Sexual Activity     Alcohol use: Not on file     Drug use: Not on file     Sexual activity: Not on file   Other Topics Concern     Not on file   Social History Narrative     Not on file      Social Drivers of Health     Financial Resource Strain: Low Risk  (6/19/2025)    Financial Resource Strain      Within the past 12 months, have you or your family members you live with been unable to get utilities (heat, electricity) when it was really needed?: No   Food Insecurity: Low Risk  (6/19/2025)    Food Insecurity      Within the past 12 months, did you worry that your food would run out before you got money to buy more?: No      Within the past 12 months, did the food you bought just not last and you didn t have money to get more?: No   Transportation Needs: Low Risk  (6/19/2025)    Transportation Needs      Within the past 12 months, has lack of transportation kept you from medical appointments, getting your medicines, non-medical meetings or appointments, work, or from getting things that you need?: No   Physical Activity: Unknown (6/19/2025)    Exercise Vital Sign      Days of Exercise per Week: 0 days      Minutes of Exercise per Session: Not on file   Stress: No Stress Concern Present (6/19/2025)    [employer] [place] [employer] - Occupational Stress Questionnaire      Feeling of Stress : Not at all   Social Connections: Unknown (6/19/2025)    Social Connection and Isolation Panel [NHANES]      Frequency of Communication with Friends and Family: Not on file      Frequency of Social Gatherings with Friends and Family: Never      Attends Buddhism Services: Not on file      Active Member of Clubs or Organizations: Not on file      Attends Club or Organization Meetings: Not on file      Marital Status: Not on file   Interpersonal Safety: Not on file   Housing Stability: Low Risk  (6/19/2025)    Housing Stability      Do you have housing? : Yes      Are you worried about losing your housing?: No     Family History   Problem Relation Age of Onset     Glaucoma No family hx of      Macular Degeneration No family hx of        Medical records were reviewed and are summarized above.    Review of  Systems    PHYSICAL EXAM:  Focused lower extremity physical exam:     Derm: Skin is cool, thin, dry, intact.  Small hemorrhagic blister appreciated to the distal tuft of the left hallux digit.  Upon debridement of this blister there is a small superficial ulceration to this area with no acute sign of infection.  Nails are thickened, elongated, discolored with subungual debris x 10.    Vasc: Dorsalis pedis pulses, 1/4 bilateral. Posterior tibial pulses 1/4 bilateral. Cap fill time < 3 seconds to the digits.  Mild nonpitting edema and varicosities appreciated to the hindfoot and ankles bilaterally. Hair absent present to the toes.     Neuro: Reactive to light touch bilaterally  MSK:   - Seemingly tender to palpation to the elongated nails, especially the hallux nails.  Compartments soft and compressible.    Assessment:  - Painful onychomycosis x 10  - PAD  - Hemorrhagic blister, right hallux    Plan:  - Patient was seen and evaluated in clinic by myself.   - Hemorrhagic blister, right hallux:  Discussed hemorrhagic blister.  This area was deroofed without complication.  Small superficial ulceration remains.  Dry sterile dressing was placed to the area.  Discussed changing this dressing daily until this has scabbed over.    - Painful onychomycosis:  Discussed the patient's onychomycosis.  Discussed the nail dystrophy.  Discussed the subungual debris.  Discussed the difference between traumatic dystrophy of the nail versus onychomycosis of the nail.  Discussed topical versus oral antifungals.  Discussed antifungal treatment of the nail versus trimming and debridement of the nail.    - PAD:  Discussed advanced trophic changes of the skin including decreased hair growth, dystrophic nail changes, pigmentary changes and skin temperature texture changes of the bilateral feet and ankles.  Discussed edematous changes as well as temperature changes of the feet.    - Procedure - nail debridement 6 or greater:  After verbal and  written consent were obtained, all nails, x 10, were manually debrided without complication.  Patient noted immediate relief.  Patient tolerated procedure well.    - Discussed with the caretaker that the patient may follow-up in 9 to 12 weeks or sooner symptoms arise.    Barrera Gauthier DPM    Again, thank you for allowing me to participate in the care of your patient.        Sincerely,        Barrera Gauthier DPM    Electronically signed